# Patient Record
Sex: MALE | Race: WHITE | Employment: FULL TIME | URBAN - METROPOLITAN AREA
[De-identification: names, ages, dates, MRNs, and addresses within clinical notes are randomized per-mention and may not be internally consistent; named-entity substitution may affect disease eponyms.]

---

## 2017-10-05 ENCOUNTER — IMMUNIZATION (OUTPATIENT)
Dept: FAMILY MEDICINE CLINIC | Facility: CLINIC | Age: 37
End: 2017-10-05

## 2017-10-05 DIAGNOSIS — Z23 NEED FOR VACCINATION: ICD-10-CM

## 2017-10-05 PROCEDURE — 90471 IMMUNIZATION ADMIN: CPT | Performed by: FAMILY MEDICINE

## 2017-10-05 PROCEDURE — 90686 IIV4 VACC NO PRSV 0.5 ML IM: CPT | Performed by: FAMILY MEDICINE

## 2018-02-05 ENCOUNTER — OFFICE VISIT (OUTPATIENT)
Dept: FAMILY MEDICINE CLINIC | Facility: CLINIC | Age: 38
End: 2018-02-05

## 2018-02-05 VITALS
DIASTOLIC BLOOD PRESSURE: 81 MMHG | WEIGHT: 268 LBS | OXYGEN SATURATION: 96 % | BODY MASS INDEX: 35.52 KG/M2 | HEART RATE: 81 BPM | HEIGHT: 73 IN | SYSTOLIC BLOOD PRESSURE: 116 MMHG

## 2018-02-05 DIAGNOSIS — L84 PRE-ULCERATIVE CORN OR CALLOUS: ICD-10-CM

## 2018-02-05 DIAGNOSIS — R23.4 FISSURE IN SKIN OF FOOT: ICD-10-CM

## 2018-02-05 DIAGNOSIS — B35.1 ONYCHOMYCOSIS OF RIGHT GREAT TOE: Primary | ICD-10-CM

## 2018-02-05 PROCEDURE — 99213 OFFICE O/P EST LOW 20 MIN: CPT | Performed by: FAMILY MEDICINE

## 2018-02-05 RX ORDER — BETAMETHASONE DIPROPIONATE 0.5 MG/G
OINTMENT TOPICAL
Qty: 45 G | Refills: 0 | Status: SHIPPED | OUTPATIENT
Start: 2018-02-05 | End: 2018-03-16 | Stop reason: ALTCHOICE

## 2018-02-05 NOTE — PROGRESS NOTES
Ansley Cho is a 30year old male. HPI:   Patient presents with:   Foot Pain: bilateral near little toe x 2months  Fungus Nails: great toe right foot x 2 yers    2 months ago patient started experiencing a b/l  foot callus that became split and fissure denies shortness of breath with exertion  CARDIOVASCULAR: denies chest pain on exertion  GI: denies abdominal pain and denies heartburn  NEURO: denies headaches  Musculoskeletal: No motor deficits  EXAM:   /81 (BP Location: Right arm, Patient Positio patient indicates understanding of these issues and agrees to the plan. The patient is asked to return in as needed.

## 2018-03-16 ENCOUNTER — OFFICE VISIT (OUTPATIENT)
Dept: FAMILY MEDICINE CLINIC | Facility: CLINIC | Age: 38
End: 2018-03-16

## 2018-03-16 ENCOUNTER — HOSPITAL ENCOUNTER (OUTPATIENT)
Dept: ULTRASOUND IMAGING | Age: 38
Discharge: HOME | End: 2018-03-16
Attending: FAMILY MEDICINE
Payer: COMMERCIAL

## 2018-03-16 ENCOUNTER — APPOINTMENT (OUTPATIENT)
Dept: GENERAL RADIOLOGY | Age: 38
End: 2018-03-16
Attending: FAMILY MEDICINE
Payer: COMMERCIAL

## 2018-03-16 ENCOUNTER — HOSPITAL ENCOUNTER (OUTPATIENT)
Dept: GENERAL RADIOLOGY | Age: 38
Discharge: HOME | End: 2018-03-16
Attending: FAMILY MEDICINE
Payer: COMMERCIAL

## 2018-03-16 VITALS
DIASTOLIC BLOOD PRESSURE: 76 MMHG | HEART RATE: 68 BPM | WEIGHT: 269 LBS | SYSTOLIC BLOOD PRESSURE: 112 MMHG | BODY MASS INDEX: 35 KG/M2

## 2018-03-16 DIAGNOSIS — R31.0 GROSS HEMATURIA: ICD-10-CM

## 2018-03-16 DIAGNOSIS — Z87.442 HISTORY OF KIDNEY STONES: ICD-10-CM

## 2018-03-16 DIAGNOSIS — R30.0 DYSURIA: ICD-10-CM

## 2018-03-16 DIAGNOSIS — R30.0 DYSURIA: Primary | ICD-10-CM

## 2018-03-16 LAB
APPEARANCE: CLEAR
BILIRUBIN: NEGATIVE
GLUCOSE (URINE DIPSTICK): NEGATIVE MG/DL
KETONES (URINE DIPSTICK): NEGATIVE MG/DL
LEUKOCYTES: NEGATIVE
MULTISTIX LOT#: NORMAL NUMERIC
NITRITE, URINE: NEGATIVE
PH, URINE: 7.5 (ref 4.5–8)
PROTEIN (URINE DIPSTICK): 30 MG/DL
SPECIFIC GRAVITY: 1.01 (ref 1–1.03)
UROBILINOGEN,SEMI-QN: 0.2 MG/DL (ref 0–1.9)

## 2018-03-16 PROCEDURE — 99214 OFFICE O/P EST MOD 30 MIN: CPT | Performed by: FAMILY MEDICINE

## 2018-03-16 PROCEDURE — 74018 RADEX ABDOMEN 1 VIEW: CPT | Performed by: FAMILY MEDICINE

## 2018-03-16 PROCEDURE — 87086 URINE CULTURE/COLONY COUNT: CPT | Performed by: FAMILY MEDICINE

## 2018-03-16 PROCEDURE — 76770 US EXAM ABDO BACK WALL COMP: CPT | Performed by: FAMILY MEDICINE

## 2018-03-16 PROCEDURE — 81003 URINALYSIS AUTO W/O SCOPE: CPT | Performed by: FAMILY MEDICINE

## 2018-03-16 NOTE — PATIENT INSTRUCTIONS
Hematuria: Possible Causes     Many things can lead to blood in the urine (hematuria). The blood may be seen with the eye (macroscopic or gross hematuria). Or it may only be seen when the urine is looked at under a microscope (microscopic hematuria).  Fabian © 9786-0738 [employer] [address]. [address] [facility], [facility] [place]. All rights reserved. This information is not intended as a substitute for professional medical care. Always follow your healthcare professional's instructions.         Prevent For calcium oxalate stones: Limit animal protein, such as meat, eggs, and fish. Limit grapefruit juice and alcohol. Limit high-oxalate foods (such as cola, tea, chocolate, spinach, rhubarb, wheat bran, and peanuts).   For uric acid stones: Limit high-purine

## 2018-03-16 NOTE — PROGRESS NOTES
Fuentes Ellison is a 30year old male. HPI:   Patient complains of hematuria ×1 day. Kidney stone in the past 5 years ago, passed on its own the episode was very painful. .  Urologist prostate exam 5 years ago was scheduled for a cystoscopy never had it d Alcohol use:  Yes                REVIEW OF SYSTEMS:   GENERAL HEALTH: feels well otherwise  SKIN: denies any unusual skin lesions or rashes  RESPIRATORY: denies shortness of breath with exertion  CARDIOVASCULAR: denies chest pain on exertion  GI ROUTINE; Future  - URINE CULTURE, ROUTINE    The patient indicates understanding of these issues and agrees to the plan. The patient is asked to return in  As needed.

## 2018-03-17 RX ORDER — HYDROCODONE BITARTRATE AND ACETAMINOPHEN 10; 325 MG/1; MG/1
1 TABLET ORAL EVERY 4 HOURS PRN
Qty: 20 TABLET | Refills: 0 | Status: SHIPPED | OUTPATIENT
Start: 2018-03-17 | End: 2018-08-17 | Stop reason: ALTCHOICE

## 2018-03-17 RX ORDER — TAMSULOSIN HYDROCHLORIDE 0.4 MG/1
0.4 CAPSULE ORAL DAILY
Qty: 30 CAPSULE | Refills: 0 | Status: SHIPPED | OUTPATIENT
Start: 2018-03-17 | End: 2018-04-13

## 2018-03-17 NOTE — PROGRESS NOTES
Ultrasound consistent with kidney stone present bilateral kidney and another stone in the urinary bladder near the left ureteral vesicular junction. Will refer to urology. Continue with drinking plenty of water.

## 2018-03-22 ENCOUNTER — HOSPITAL ENCOUNTER (OUTPATIENT)
Dept: CT IMAGING | Age: 38
Discharge: HOME | End: 2018-03-22
Attending: UROLOGY
Payer: COMMERCIAL

## 2018-03-22 DIAGNOSIS — Z87.442 HISTORY OF KIDNEY STONES: ICD-10-CM

## 2018-03-22 PROCEDURE — 74178 CT ABD&PLV WO CNTR FLWD CNTR: CPT | Performed by: UROLOGY

## 2018-04-13 RX ORDER — TAMSULOSIN HYDROCHLORIDE 0.4 MG/1
0.4 CAPSULE ORAL DAILY
Qty: 30 CAPSULE | Refills: 1 | Status: SHIPPED | OUTPATIENT
Start: 2018-04-13 | End: 2018-05-13

## 2018-08-17 ENCOUNTER — OFFICE VISIT (OUTPATIENT)
Dept: FAMILY MEDICINE CLINIC | Facility: CLINIC | Age: 38
End: 2018-08-17
Payer: COMMERCIAL

## 2018-08-17 VITALS
HEIGHT: 73.62 IN | BODY MASS INDEX: 35.8 KG/M2 | SYSTOLIC BLOOD PRESSURE: 128 MMHG | WEIGHT: 276 LBS | DIASTOLIC BLOOD PRESSURE: 82 MMHG | HEART RATE: 84 BPM

## 2018-08-17 DIAGNOSIS — K62.5 RECTAL BLEEDING: ICD-10-CM

## 2018-08-17 DIAGNOSIS — Z80.0 FH: COLON CANCER: ICD-10-CM

## 2018-08-17 DIAGNOSIS — Z12.11 COLON CANCER SCREENING: ICD-10-CM

## 2018-08-17 DIAGNOSIS — Z23 ENCOUNTER FOR ADMINISTRATION OF VACCINE: ICD-10-CM

## 2018-08-17 DIAGNOSIS — Z82.49 FH: CAD (CORONARY ARTERY DISEASE): ICD-10-CM

## 2018-08-17 DIAGNOSIS — Z00.00 WELL ADULT EXAM: Primary | ICD-10-CM

## 2018-08-17 DIAGNOSIS — R06.83 SNORING: ICD-10-CM

## 2018-08-17 DIAGNOSIS — Z00.00 LABORATORY EXAMINATION ORDERED AS PART OF A ROUTINE GENERAL MEDICAL EXAMINATION: ICD-10-CM

## 2018-08-17 DIAGNOSIS — E66.9 OBESITY (BMI 35.0-39.9 WITHOUT COMORBIDITY): ICD-10-CM

## 2018-08-17 PROBLEM — R31.0 GROSS HEMATURIA: Status: RESOLVED | Noted: 2018-03-16 | Resolved: 2018-08-17

## 2018-08-17 PROBLEM — Z87.442 HISTORY OF KIDNEY STONES: Status: RESOLVED | Noted: 2018-03-16 | Resolved: 2018-08-17

## 2018-08-17 PROCEDURE — 99395 PREV VISIT EST AGE 18-39: CPT | Performed by: FAMILY MEDICINE

## 2018-08-17 PROCEDURE — 90632 HEPA VACCINE ADULT IM: CPT | Performed by: FAMILY MEDICINE

## 2018-08-17 PROCEDURE — 90471 IMMUNIZATION ADMIN: CPT | Performed by: FAMILY MEDICINE

## 2018-08-17 NOTE — PROGRESS NOTES
Montana Garrett is a 41year old male who presents for a complete physical exam.   HPI:   Pt here for CPE  --wife states he snores loud  --Dad CAD/bypass  --Mom colon cancer  --Non smoker, minimal exercise, diet veeterian  --No prior colonoscopy Mom colon Social History:  Smoking status: Never Smoker                                                              Smokeless tobacco: Never Used                      Alcohol use: No               Occ:  for water. : yes.  Childre air excursion  CARDIOVASCULAR: RRR, no murmur, no lower extremity edema, pedal and femoral pulses 2+ and symmetric b/l  GI: normoactive BS, non-distended, non-tender to palpation, no HSM/masses/pulsations  : two descended testes, no scrotal masses, no he DIAGNOSTIC SLEEP STUDY    3. Obesity (BMI 35.0-39.9 without comorbidity)  Weight loss discussed patient should start exercising daily for 30-40 minutes also reducing all carbohydrates both simple and complex.   Can eat fruits and vegetables and small freque

## 2018-08-17 NOTE — PATIENT INSTRUCTIONS
Routine Healthcare for Men   Routine checkups can find treatable problems early. For many medical problems, early treatment can help prevent more serious complications. The value of checkups and how often you have them depend mainly on your age.  Your perso controversial. Many studies have been done, but they do not yet show that it is practical to do it on all men at their checkups. The test often gives misleading results and can cause undue anxiety, expense, and unnecessary medical procedures.  You should di whooping cough (pertussis) as well as tetanus. If you are 72 or older, this new vaccine has not yet been approved for your age group.  Because babies are most susceptible to complications from whooping cough, Tdap is especially recommended for adults caring vegetables in your diet. Get regular physical activity or exercise. Injury prevention: Use lap and shoulder belts when you drive. Use a helmet when you ride a motorcycle or bicycle.  If you are around guns or other firearms, practice safe handling and lauren

## 2018-08-18 PROBLEM — Z12.11 COLON CANCER SCREENING: Status: ACTIVE | Noted: 2018-08-18

## 2018-08-18 PROBLEM — L84 PRE-ULCERATIVE CORN OR CALLOUS: Status: RESOLVED | Noted: 2018-02-05 | Resolved: 2018-08-18

## 2018-08-18 PROBLEM — R23.4 FISSURE IN SKIN OF FOOT: Status: RESOLVED | Noted: 2018-02-05 | Resolved: 2018-08-18

## 2018-08-18 PROBLEM — Z82.49 FH: CAD (CORONARY ARTERY DISEASE): Status: ACTIVE | Noted: 2018-08-18

## 2018-08-18 PROBLEM — R06.83 SNORING: Status: ACTIVE | Noted: 2018-08-18

## 2018-08-18 PROBLEM — K62.5 RECTAL BLEEDING: Status: ACTIVE | Noted: 2018-08-18

## 2018-08-18 PROBLEM — R30.0 DYSURIA: Status: RESOLVED | Noted: 2018-03-16 | Resolved: 2018-08-18

## 2018-08-18 PROBLEM — Z80.0 FH: COLON CANCER: Status: ACTIVE | Noted: 2018-08-18

## 2018-08-18 PROBLEM — E66.9 OBESITY (BMI 35.0-39.9 WITHOUT COMORBIDITY): Status: ACTIVE | Noted: 2018-08-18

## 2018-08-29 ENCOUNTER — LAB ENCOUNTER (OUTPATIENT)
Dept: LAB | Age: 38
End: 2018-08-29
Attending: FAMILY MEDICINE
Payer: COMMERCIAL

## 2018-08-29 ENCOUNTER — TELEPHONE (OUTPATIENT)
Dept: FAMILY MEDICINE CLINIC | Facility: CLINIC | Age: 38
End: 2018-08-29

## 2018-08-29 DIAGNOSIS — Z00.00 LABORATORY EXAMINATION ORDERED AS PART OF A ROUTINE GENERAL MEDICAL EXAMINATION: ICD-10-CM

## 2018-08-29 DIAGNOSIS — R73.09 ELEVATED GLUCOSE: Primary | ICD-10-CM

## 2018-08-29 DIAGNOSIS — R73.09 ELEVATED GLUCOSE: ICD-10-CM

## 2018-08-29 DIAGNOSIS — E78.00 ELEVATED CHOLESTEROL: ICD-10-CM

## 2018-08-29 LAB
ALBUMIN SERPL-MCNC: 4.1 G/DL (ref 3.5–4.8)
ALBUMIN/GLOB SERPL: 1.1 {RATIO} (ref 1–2)
ALP LIVER SERPL-CCNC: 88 U/L (ref 45–117)
ALT SERPL-CCNC: 45 U/L (ref 17–63)
ANION GAP SERPL CALC-SCNC: 4 MMOL/L (ref 0–18)
AST SERPL-CCNC: 31 U/L (ref 15–41)
BASOPHILS # BLD AUTO: 0.04 X10(3) UL (ref 0–0.1)
BASOPHILS NFR BLD AUTO: 0.4 %
BILIRUB SERPL-MCNC: 0.6 MG/DL (ref 0.1–2)
BUN BLD-MCNC: 16 MG/DL (ref 8–20)
BUN/CREAT SERPL: 17.6 (ref 10–20)
CALCIUM BLD-MCNC: 9.3 MG/DL (ref 8.3–10.3)
CHLORIDE SERPL-SCNC: 104 MMOL/L (ref 101–111)
CHOLEST SMN-MCNC: 206 MG/DL (ref ?–200)
CO2 SERPL-SCNC: 30 MMOL/L (ref 22–32)
CREAT BLD-MCNC: 0.91 MG/DL (ref 0.7–1.3)
EOSINOPHIL # BLD AUTO: 0.19 X10(3) UL (ref 0–0.3)
EOSINOPHIL NFR BLD AUTO: 1.8 %
ERYTHROCYTE [DISTWIDTH] IN BLOOD BY AUTOMATED COUNT: 12.7 % (ref 11.5–16)
EST. AVERAGE GLUCOSE BLD GHB EST-MCNC: 157 MG/DL (ref 68–126)
GLOBULIN PLAS-MCNC: 3.8 G/DL (ref 2.5–4)
GLUCOSE BLD-MCNC: 132 MG/DL (ref 70–99)
HBA1C MFR BLD HPLC: 7.1 % (ref ?–5.7)
HCT VFR BLD AUTO: 49.6 % (ref 37–53)
HDLC SERPL-MCNC: 41 MG/DL (ref 40–59)
HGB BLD-MCNC: 16.2 G/DL (ref 13–17)
IMMATURE GRANULOCYTE COUNT: 0.04 X10(3) UL (ref 0–1)
IMMATURE GRANULOCYTE RATIO %: 0.4 %
LDLC SERPL CALC-MCNC: 136 MG/DL (ref ?–100)
LYMPHOCYTES # BLD AUTO: 2.6 X10(3) UL (ref 0.9–4)
LYMPHOCYTES NFR BLD AUTO: 24.2 %
M PROTEIN MFR SERPL ELPH: 7.9 G/DL (ref 6.1–8.3)
MCH RBC QN AUTO: 28.6 PG (ref 27–33.2)
MCHC RBC AUTO-ENTMCNC: 32.7 G/DL (ref 31–37)
MCV RBC AUTO: 87.6 FL (ref 80–99)
MONOCYTES # BLD AUTO: 0.74 X10(3) UL (ref 0.1–1)
MONOCYTES NFR BLD AUTO: 6.9 %
NEUTROPHIL ABS PRELIM: 7.15 X10 (3) UL (ref 1.3–6.7)
NEUTROPHILS # BLD AUTO: 7.15 X10(3) UL (ref 1.3–6.7)
NEUTROPHILS NFR BLD AUTO: 66.3 %
NONHDLC SERPL-MCNC: 165 MG/DL (ref ?–130)
OSMOLALITY SERPL CALC.SUM OF ELEC: 289 MOSM/KG (ref 275–295)
PLATELET # BLD AUTO: 299 10(3)UL (ref 150–450)
POTASSIUM SERPL-SCNC: 4.2 MMOL/L (ref 3.6–5.1)
RBC # BLD AUTO: 5.66 X10(6)UL (ref 4.3–5.7)
RED CELL DISTRIBUTION WIDTH-SD: 40.3 FL (ref 35.1–46.3)
SODIUM SERPL-SCNC: 138 MMOL/L (ref 136–144)
T4 FREE SERPL-MCNC: 1 NG/DL (ref 0.9–1.8)
TRIGL SERPL-MCNC: 143 MG/DL (ref 30–149)
TSI SER-ACNC: 1.42 MIU/ML (ref 0.35–5.5)
VLDLC SERPL CALC-MCNC: 29 MG/DL (ref 0–30)
WBC # BLD AUTO: 10.8 X10(3) UL (ref 4–13)

## 2018-08-29 PROCEDURE — 80053 COMPREHEN METABOLIC PANEL: CPT

## 2018-08-29 PROCEDURE — 84443 ASSAY THYROID STIM HORMONE: CPT

## 2018-08-29 PROCEDURE — 85025 COMPLETE CBC W/AUTO DIFF WBC: CPT

## 2018-08-29 PROCEDURE — 84439 ASSAY OF FREE THYROXINE: CPT

## 2018-08-29 PROCEDURE — 83036 HEMOGLOBIN GLYCOSYLATED A1C: CPT

## 2018-08-29 PROCEDURE — 80061 LIPID PANEL: CPT

## 2018-08-29 PROCEDURE — 36415 COLL VENOUS BLD VENIPUNCTURE: CPT

## 2018-08-29 NOTE — TELEPHONE ENCOUNTER
Hemoglobin A1C was added to existing specimen. Lipid panel order was placed to be due in 6 months. Per Hair Alva PA-C, the patient was informed of his test results and Melly's recommendations via my chart.

## 2018-08-29 NOTE — TELEPHONE ENCOUNTER
----- Message from Rosenda Low PA-C sent at 8/29/2018 12:41 PM CDT -----  Add HBA1C elevated glucose. Lipids are elevated both cholesterol and LDL. Diet discussed: Patient is to decrease saturated fats avoid foods with trans-fats.    Add valeriano seeds, a

## 2018-08-29 NOTE — PROGRESS NOTES
Add HBA1C elevated glucose. Lipids are elevated both cholesterol and LDL. Diet discussed: Patient is to decrease saturated fats avoid foods with trans-fats.    Add valeriano seeds, almonds, walnuts, pumpkin seeds, sunflower seeds, coconut oil, virgin olive oil,

## 2018-08-31 NOTE — PROGRESS NOTES
Hemoglobin A1c is at 7.1 indicating diabetes follow-up in the office to discuss medication, referral to dietitian and diabetic nurse. Low carbohydrate diet needed with healthy amounts of protein, fruits and vegetables. Weight loss needed.   Sent to my [place]

## 2018-09-07 ENCOUNTER — OFFICE VISIT (OUTPATIENT)
Dept: SLEEP CENTER | Age: 38
End: 2018-09-07
Attending: FAMILY MEDICINE
Payer: COMMERCIAL

## 2018-09-07 DIAGNOSIS — R06.83 SNORING: ICD-10-CM

## 2018-09-07 DIAGNOSIS — E66.9 OBESITY (BMI 35.0-39.9 WITHOUT COMORBIDITY): ICD-10-CM

## 2018-09-07 DIAGNOSIS — Z82.49 FH: CAD (CORONARY ARTERY DISEASE): ICD-10-CM

## 2018-09-07 PROCEDURE — 95810 POLYSOM 6/> YRS 4/> PARAM: CPT

## 2018-09-11 ENCOUNTER — OFFICE VISIT (OUTPATIENT)
Dept: FAMILY MEDICINE CLINIC | Facility: CLINIC | Age: 38
End: 2018-09-11
Payer: COMMERCIAL

## 2018-09-11 DIAGNOSIS — E11.9 NEW ONSET TYPE 2 DIABETES MELLITUS (HCC): Primary | ICD-10-CM

## 2018-09-11 DIAGNOSIS — E66.9 OBESITY (BMI 35.0-39.9 WITHOUT COMORBIDITY): ICD-10-CM

## 2018-09-11 DIAGNOSIS — Z23 NEED FOR VACCINATION: ICD-10-CM

## 2018-09-11 DIAGNOSIS — E78.2 MIXED HYPERLIPIDEMIA: ICD-10-CM

## 2018-09-11 LAB
CREAT UR-SCNC: 284 MG/DL
MICROALBUMIN UR-MCNC: 5.75 MG/DL
MICROALBUMIN/CREAT 24H UR-RTO: 20.2 UG/MG (ref ?–30)

## 2018-09-11 PROCEDURE — 82570 ASSAY OF URINE CREATININE: CPT | Performed by: FAMILY MEDICINE

## 2018-09-11 PROCEDURE — 90471 IMMUNIZATION ADMIN: CPT | Performed by: FAMILY MEDICINE

## 2018-09-11 PROCEDURE — 99214 OFFICE O/P EST MOD 30 MIN: CPT | Performed by: FAMILY MEDICINE

## 2018-09-11 PROCEDURE — 90686 IIV4 VACC NO PRSV 0.5 ML IM: CPT | Performed by: FAMILY MEDICINE

## 2018-09-11 PROCEDURE — 82043 UR ALBUMIN QUANTITATIVE: CPT | Performed by: FAMILY MEDICINE

## 2018-09-11 RX ORDER — LANCETS 30 GAUGE
EACH MISCELLANEOUS
Qty: 100 EACH | Refills: 0 | Status: SHIPPED | OUTPATIENT
Start: 2018-09-11 | End: 2019-05-08 | Stop reason: CLARIF

## 2018-09-11 RX ORDER — ROSUVASTATIN CALCIUM 5 MG/1
5 TABLET, COATED ORAL NIGHTLY
Qty: 30 TABLET | Refills: 5 | Status: SHIPPED | OUTPATIENT
Start: 2018-09-11 | End: 2018-12-21

## 2018-09-11 RX ORDER — DIPHENHYDRAMINE HYDROCHLORIDE 25 MG/1
CAPSULE, LIQUID FILLED ORAL
Qty: 1 KIT | Refills: 0 | Status: SHIPPED | OUTPATIENT
Start: 2018-09-11 | End: 2018-12-11

## 2018-09-11 RX ORDER — LISINOPRIL 5 MG/1
5 TABLET ORAL DAILY
Qty: 30 TABLET | Refills: 5 | Status: SHIPPED | OUTPATIENT
Start: 2018-09-11 | End: 2018-10-11 | Stop reason: SINTOL

## 2018-09-11 RX ORDER — GLUCOSAMINE HCL/CHONDROITIN SU 500-400 MG
CAPSULE ORAL
Qty: 100 STRIP | Refills: 0 | Status: SHIPPED | OUTPATIENT
Start: 2018-09-11 | End: 2018-12-07

## 2018-09-11 NOTE — PROGRESS NOTES
HPI:   Ansley Cho is a 41year old male who presents for evaluation of new onset diabetes, hyperlipidemia, obesity. Hemoglobin A1c recently done 7.1. Is willing to practice lifestyle changes as well as try medication.   Patient has not been to a [employer] 3.6 - 5.1 mmol/L 4.2   Chloride      101 - 111 mmol/L 104   Carbon Dioxide, Total      22.0 - 32.0 mmol/L 30.0   ANION GAP      0 - 18 mmol/L 4   BUN      8 - 20 mg/dL 16   CREATININE      0.70 - 1.30 mg/dL 0.91   BUN/CREA RATIO      10.0 - 20.0 17.6   CINDY No Known Allergies   History reviewed. No pertinent past medical history.    Past Surgical History:  2013: OTHER; Bilateral      Comment:  wisdom teeth extraction   Social History: Social History    Tobacco Use      Smoking status: Never Smoker      Smoke [[id number]]      Meds & Refills for this Visit:  Requested Prescriptions     Signed Prescriptions Disp Refills   • lisinopril 5 MG Oral Tab 30 tablet 5     Sig: Take 1 tablet (5 mg total) by mouth daily.    • Rosuvastatin Calcium 5 MG Oral Tab 30 tablet 5     S Dispense: 30 tablet; Refill: 5  - MetFORMIN HCl 500 MG Oral Tab; Take 1 tablet (500 mg total) by mouth 2 (two) times daily with meals. Dispense: 60 tablet; Refill: 5  - MICROALB/CREAT RATIO, RANDOM URINE;  Future  - FLULAVAL INFLUENZA VACCINE QUAD PRESERVA

## 2018-09-11 NOTE — PATIENT INSTRUCTIONS
SCHEDULING [facility] LAB APPOINTMENTS ONLINE    Lab appointments can now be scheduled online at [email or web address]    · Go to [email or web address]  · In Search type Lab  · Click \"Lab services\"  · Click \"Schedule Your Test Online\"  · Follow the prompts  · If you advised by your healthcare provider to check your blood sugar in the morning, at bedtime, and before and after meals. Be sure to write down all of your numbers. Also use your log to record things that might have affected your blood sugar.  Some examples inc you aren’t normally active, be sure to consult your healthcare provider before getting started. How much activity do you need? If daily activity is new to you, start slow and steady. Begin with 10 minutes of activity each day.  Then work up to at least 13 usual. This is especially true if you take medicine to manage your blood sugar. But there are things you can do to help reduce the risk of accidental lows. Keep these tips in mind:  · Always carry identification when you exercise outside your home.  Carry a stay healthy. Eating well-balanced meals in the correct amounts will help you control your blood glucose levels and prevent low blood sugar reactions. It will also help you reduce the health risks of diabetes.  There is no one specific diet that is right fo People with diabetes already have a risk of high blood pressure and heart disease. · Stay at a healthy weight. If you need to lose weight, cut down on your portion sizes. But don’t skip meals.  Exercise is an important part of any weight management program active you are. Your healthcare team will help you figure out the right amount of carbohydrates for you.  You may start with around 45 to 60 grams of carbohydrates per meal, depending on your situation.   Here are some examples of foods containing about 15 This will help when you are away from home and can’t measure your servings. · Eat only the number of servings given on your meal plan for each food. Don’t take seconds. · Learn to read food labels.  Be sure to look at serving size, total carbohydrates, fi or snack can make your blood sugar drop too low. It can also cause you to eat too much at the next meal or snack. Then your blood sugar could get too high. Date Last Reviewed: 7/1/2016  © 8079-3169 [employer] [address].  [address] Fix feeling tired and rundown. Why high blood sugar is a problem  If high blood sugar is not controlled, blood vessels throughout the body become damaged. Prolonged high blood sugar affects organs, blood vessels, and nerves.  As a result, the risks of damage t skin.  Warning signs  Look for any color changes in the foot. Redness with streaks can signal a severe infection, which needs immediate medical attention.  Tell your healthcare provider right away if you have any of these problems:  · Swelling, sometimes wi mind:  · “No sugar added” does not mean a product is sugar-free. · \"Sugar-free\" means less than 1/2 gram (g) of sugar per serving. · “Fat free” means less than 1/2 g of fat per serving. This does not necessarily mean the product is low in calories.   · time your meals to help keep your blood sugar level steady. You may need to adjust your plan for special situations. Eat from all the food groups  The basis of a healthy meal plan is variety (eating many different types of foods).  Look for lean meats, kim   [address], [place], [address] [place]. All rights reserved. This information is not intended as a substitute for professional medical care. Always follow your healthcare professional's instructions.

## 2018-09-12 VITALS
WEIGHT: 266 LBS | HEART RATE: 88 BPM | BODY MASS INDEX: 34.14 KG/M2 | DIASTOLIC BLOOD PRESSURE: 78 MMHG | HEIGHT: 74 IN | SYSTOLIC BLOOD PRESSURE: 108 MMHG

## 2018-09-12 PROBLEM — E11.9 NEW ONSET TYPE 2 DIABETES MELLITUS (HCC): Status: ACTIVE | Noted: 2018-09-12

## 2018-09-14 ENCOUNTER — TELEPHONE (OUTPATIENT)
Dept: FAMILY MEDICINE CLINIC | Facility: CLINIC | Age: 38
End: 2018-09-14

## 2018-09-14 DIAGNOSIS — G47.33 OSA (OBSTRUCTIVE SLEEP APNEA): Primary | ICD-10-CM

## 2018-09-14 NOTE — TELEPHONE ENCOUNTER
----- Message from Martha Mcpherson MD sent at 9/14/2018 11:52 AM CDT -----  Please notify patient,   Schedule follow up   Update flowsheet. Recommend CPAP titration . Ok to order an schedule if agreeable.

## 2018-09-14 NOTE — PROCEDURES
[address] [address],[facility] [address]       Accredited by the [facility] of Sleep Medicine (AASM)    PATIENT'S NAME:        Maribel Burleson PHYSICIAN:     REFERRING PHYSICIAN:     PATIENT ACCOUNT #:     [de-identified]        LOCA 19 apneas, for an apnea-hypopnea index of 70.5. His supine AHI was 95.3, but this was not significantly worsened in REM. The baseline saturation was 92.6%. The lowest oxygen saturation was 74.3%. There were no limb movements recorded. EKG:   The ba 9:57pm  Respiratory Events: hypopneas, RERAs  Overall RDI:   Mild to Moderate(supine)  Snoring Events:    Soft to Loud  Limb Movements:  none  Sleep Positions Recorded: Lateral, Supinea    Discharge Note: Patient discharged to home.  Patient instructed t - - - -   All Apneas 19 - 19 - 19   Obstructive Hypopnea 336 [phone]   Central Hypopnea - - - - -   All Hypopneas 339 91 417 14 431   All Apneas + Hypopneas 358 91 436 14 450   Apnea Index 4.1 - 4.2 - 3.0   AHI 78.0 50.8 95.3 7.7 70.5   RDI: 67.9 44.

## 2018-09-19 NOTE — TELEPHONE ENCOUNTER
Patient informed of the sleep study results and recommendations. Patient in agreement to go ahead with the Titration study. States he will have it done in [place] again. Patient will call the [place] office tomorrow to schedule an appt.   Please plac

## 2018-10-11 ENCOUNTER — TELEPHONE (OUTPATIENT)
Dept: FAMILY MEDICINE CLINIC | Facility: CLINIC | Age: 38
End: 2018-10-11

## 2018-10-11 RX ORDER — LOSARTAN POTASSIUM 25 MG/1
25 TABLET ORAL DAILY
Qty: 90 TABLET | Refills: 1 | Status: SHIPPED | OUTPATIENT
Start: 2018-10-11 | End: 2019-03-28

## 2018-10-11 NOTE — TELEPHONE ENCOUNTER
Patient is experiencing a cough related to lisinopril.   Per Baldomero Spotted, patient was switched to losartan 25 mg  A new rx was sent to the pharmacy for qty 90 + 1 refill

## 2018-10-15 ENCOUNTER — MED REC SCAN ONLY (OUTPATIENT)
Dept: FAMILY MEDICINE CLINIC | Facility: CLINIC | Age: 38
End: 2018-10-15

## 2018-10-18 ENCOUNTER — OFFICE VISIT (OUTPATIENT)
Dept: SLEEP CENTER | Age: 38
End: 2018-10-18
Attending: FAMILY MEDICINE
Payer: COMMERCIAL

## 2018-10-18 DIAGNOSIS — G47.33 OSA (OBSTRUCTIVE SLEEP APNEA): ICD-10-CM

## 2018-10-18 PROCEDURE — 95811 POLYSOM 6/>YRS CPAP 4/> PARM: CPT

## 2018-10-24 NOTE — PROCEDURES
[address] [address],[facility] 100       Accredited by the [facility] Sleep Medicine ([facility])    PATIENT'S NAME:        Ankush Gloria  ATTENDING PHYSICIAN:   Mateusz Lamb. MD Gloria  REFERRING PHYSICIAN:   Mateusz Lamb.  Bud Mac MD  [facility] water without supplemental oxygen. The patient's baseline oxygen saturation was 95.7%. The patient's lowest oxygen saturation throughout the study was 86.2%.   At the patient's final pressure of 8 cm of water, the patient had an apnea-hypopnea index of 0, Previous  sleep study performed on 9/7/18 and showed an overall AHI of 70.5, and an O2 aldair of 74%.   Patient Medical History:  CAMILA, Diabetes, Hypertension, allergies  Setup Time began:         9:20pm          Setup Time ended:        9:50pm           [facility] 98.2% 98.4% 98.8% 98.8%   Average OSat (%) 96.3% 95.5% 95.7% 95.7%       Range(%) Time in range (min) Time in range (%)   90.0 - 100.0 443.9 100.0%   80.0 - 90.0 0.1 0.0%   0.0 - 88.0 0.0 0.0%   70.0 - 80.0 - -   60.0 - 70.0 - -   0.0 - 60.0 - -    By Riya Sen 11:32:15  t: 10/24/2018 12:45:13  Job [id number]  [facility]/

## 2018-10-25 NOTE — PROGRESS NOTES
Mershon CPAP at 8     Please notify patient,   Send CPAP orders to [facility]. Schedule follow up  in office follow up 2 weeks after starting CPAP.    Update flow sheet

## 2018-10-26 ENCOUNTER — TELEPHONE (OUTPATIENT)
Dept: FAMILY MEDICINE CLINIC | Facility: CLINIC | Age: 38
End: 2018-10-26

## 2018-10-26 DIAGNOSIS — G47.33 OSA (OBSTRUCTIVE SLEEP APNEA): Primary | ICD-10-CM

## 2018-10-26 NOTE — TELEPHONE ENCOUNTER
----- Message from Juanita Yi MD sent at 10/25/2018 12:41 PM CDT -----  Bradley CPAP at 8     Please notify patient,   Send CPAP orders to [facility] Schedule follow up  in office follow up 2 weeks after starting CPAP.    Update flow sheet

## 2018-10-26 NOTE — TELEPHONE ENCOUNTER
Patient notified of results and recommendations and expressed understanding.   Script and all related documents faxed to [facility]   Patient advised on follow up appointment    Jesus Morales, 10/26/18, 2:18 PM

## 2018-12-07 DIAGNOSIS — E11.9 NEW ONSET TYPE 2 DIABETES MELLITUS (HCC): ICD-10-CM

## 2018-12-11 ENCOUNTER — LAB ENCOUNTER (OUTPATIENT)
Dept: LAB | Age: 38
End: 2018-12-11
Attending: FAMILY MEDICINE
Payer: COMMERCIAL

## 2018-12-11 ENCOUNTER — TELEPHONE (OUTPATIENT)
Dept: FAMILY MEDICINE CLINIC | Facility: CLINIC | Age: 38
End: 2018-12-11

## 2018-12-11 DIAGNOSIS — E11.9 NEW ONSET TYPE 2 DIABETES MELLITUS (HCC): ICD-10-CM

## 2018-12-11 DIAGNOSIS — E78.2 MIXED HYPERLIPIDEMIA: ICD-10-CM

## 2018-12-11 PROCEDURE — 36415 COLL VENOUS BLD VENIPUNCTURE: CPT

## 2018-12-11 PROCEDURE — 80053 COMPREHEN METABOLIC PANEL: CPT

## 2018-12-11 PROCEDURE — 80061 LIPID PANEL: CPT

## 2018-12-11 PROCEDURE — 83036 HEMOGLOBIN GLYCOSYLATED A1C: CPT

## 2018-12-11 RX ORDER — BLOOD-GLUCOSE METER
EACH MISCELLANEOUS
Qty: 1 KIT | Refills: 0 | Status: SHIPPED | OUTPATIENT
Start: 2018-12-11 | End: 2019-05-08 | Stop reason: CLARIF

## 2018-12-11 RX ORDER — BLOOD SUGAR DIAGNOSTIC
STRIP MISCELLANEOUS
Qty: 100 STRIP | Refills: 1 | Status: SHIPPED | OUTPATIENT
Start: 2018-12-11 | End: 2019-02-27

## 2018-12-11 NOTE — TELEPHONE ENCOUNTER
Need to change the diabetic supplies to Accu Chek strips and kits due to the one touch no longer being covered as of 1/1/2019    New scripts sent over to pharmacy and pt's wife notified

## 2018-12-17 PROBLEM — Z80.0 FAMILY HISTORY OF MALIGNANT NEOPLASM OF DIGESTIVE ORGAN: Status: ACTIVE | Noted: 2018-12-17

## 2018-12-17 PROBLEM — K92.1 MELENA: Status: ACTIVE | Noted: 2018-12-17

## 2018-12-21 ENCOUNTER — OFFICE VISIT (OUTPATIENT)
Dept: FAMILY MEDICINE CLINIC | Facility: CLINIC | Age: 38
End: 2018-12-21
Payer: COMMERCIAL

## 2018-12-21 VITALS
HEART RATE: 93 BPM | DIASTOLIC BLOOD PRESSURE: 84 MMHG | WEIGHT: 255 LBS | BODY MASS INDEX: 33 KG/M2 | SYSTOLIC BLOOD PRESSURE: 120 MMHG

## 2018-12-21 DIAGNOSIS — E66.9 OBESITY (BMI 35.0-39.9 WITHOUT COMORBIDITY): ICD-10-CM

## 2018-12-21 DIAGNOSIS — Z23 NEED FOR VACCINATION: ICD-10-CM

## 2018-12-21 DIAGNOSIS — E11.9 CONTROLLED TYPE 2 DIABETES MELLITUS WITHOUT COMPLICATION, WITHOUT LONG-TERM CURRENT USE OF INSULIN (HCC): Primary | ICD-10-CM

## 2018-12-21 DIAGNOSIS — G47.33 OBSTRUCTIVE SLEEP APNEA SYNDROME: ICD-10-CM

## 2018-12-21 DIAGNOSIS — E78.2 MIXED HYPERLIPIDEMIA: ICD-10-CM

## 2018-12-21 PROBLEM — K62.5 RECTAL BLEEDING: Status: RESOLVED | Noted: 2018-08-18 | Resolved: 2018-12-21

## 2018-12-21 PROBLEM — K92.1 MELENA: Status: RESOLVED | Noted: 2018-12-17 | Resolved: 2018-12-21

## 2018-12-21 PROCEDURE — 90471 IMMUNIZATION ADMIN: CPT | Performed by: FAMILY MEDICINE

## 2018-12-21 PROCEDURE — 99214 OFFICE O/P EST MOD 30 MIN: CPT | Performed by: FAMILY MEDICINE

## 2018-12-21 PROCEDURE — 90732 PPSV23 VACC 2 YRS+ SUBQ/IM: CPT | Performed by: FAMILY MEDICINE

## 2018-12-21 RX ORDER — ROSUVASTATIN CALCIUM 10 MG/1
10 TABLET, COATED ORAL NIGHTLY
Qty: 90 TABLET | Refills: 1 | Status: SHIPPED | OUTPATIENT
Start: 2018-12-21 | End: 2019-06-29

## 2018-12-21 NOTE — PROGRESS NOTES
HPI:   Buffy Quesada is a 41year old male who presents for recheck of his diabetes. Patient’s FBS have been <145 mostly 120. Last visit with ophthalmologist was no retinopathy. .    Pt has been checking his feet on a regular basis.    Pt denies any ti Take 10 mg by mouth daily. Disp:  Rfl:       No Known Allergies   Past Medical History:   Diagnosis Date   • Blood in urine 2015   • Calculus of kidney August 2018   • Diabetes mellitus [facility]) September 2018   • Hemorrhoids 2014    This date is an estimate. complication, without long-term current use of insulin (hcc)  (primary encounter diagnosis)  Mixed hyperlipidemia  Obstructive sleep apnea syndrome  Need for vaccination  Obesity (bmi 35.0-39.9 without comorbidity)    Orders Placed This Encounter      Hemo 35.0-39.9 without comorbidity)      The patient indicates understanding of these issues and agrees to the plan  The patient is asked to return in 6 months.

## 2019-01-17 DIAGNOSIS — E11.9 NEW ONSET TYPE 2 DIABETES MELLITUS (HCC): ICD-10-CM

## 2019-02-22 ENCOUNTER — TELEPHONE (OUTPATIENT)
Dept: FAMILY MEDICINE CLINIC | Facility: CLINIC | Age: 39
End: 2019-02-22

## 2019-02-22 RX ORDER — BLOOD-GLUCOSE METER
1 EACH MISCELLANEOUS 2 TIMES DAILY
Qty: 1 KIT | Refills: 0 | Status: SHIPPED | OUTPATIENT
Start: 2019-02-22 | End: 2019-05-08 | Stop reason: CLARIF

## 2019-02-22 NOTE — TELEPHONE ENCOUNTER
Patient's insurance will cover the accu-chek blood glucose monitor.   A new rx has been sent to the patient's pharmacy

## 2019-02-27 ENCOUNTER — TELEPHONE (OUTPATIENT)
Dept: FAMILY MEDICINE CLINIC | Facility: CLINIC | Age: 39
End: 2019-02-27

## 2019-02-27 RX ORDER — BLOOD SUGAR DIAGNOSTIC
STRIP MISCELLANEOUS
Qty: 200 STRIP | Refills: 1 | Status: SHIPPED | OUTPATIENT
Start: 2019-02-27 | End: 2019-05-08 | Stop reason: CLARIF

## 2019-03-28 RX ORDER — LOSARTAN POTASSIUM 25 MG/1
TABLET ORAL
Qty: 90 TABLET | Refills: 1 | Status: SHIPPED | OUTPATIENT
Start: 2019-03-28 | End: 2019-05-08 | Stop reason: CLARIF

## 2019-04-29 ENCOUNTER — TELEPHONE (OUTPATIENT)
Dept: FAMILY MEDICINE CLINIC | Facility: CLINIC | Age: 39
End: 2019-04-29

## 2019-04-29 DIAGNOSIS — E11.9 CONTROLLED TYPE 2 DIABETES MELLITUS WITHOUT COMPLICATION, WITHOUT LONG-TERM CURRENT USE OF INSULIN (HCC): Primary | ICD-10-CM

## 2019-04-29 RX ORDER — METFORMIN HYDROCHLORIDE 750 MG/1
750 TABLET, EXTENDED RELEASE ORAL
Qty: 30 TABLET | Refills: 0 | Status: SHIPPED | OUTPATIENT
Start: 2019-04-29 | End: 2019-05-28

## 2019-05-08 ENCOUNTER — APPOINTMENT (OUTPATIENT)
Dept: CT IMAGING | Age: 39
End: 2019-05-08
Attending: FAMILY MEDICINE
Payer: COMMERCIAL

## 2019-05-08 ENCOUNTER — HOSPITAL ENCOUNTER (OUTPATIENT)
Age: 39
Discharge: HOME | End: 2019-05-08
Attending: FAMILY MEDICINE
Payer: COMMERCIAL

## 2019-05-08 ENCOUNTER — HOSPITAL ENCOUNTER (INPATIENT)
Facility: HOSPITAL | Age: 39
LOS: 2 days | Discharge: HOME | DRG: 661 | End: 2019-05-10
Attending: EMERGENCY MEDICINE | Admitting: INTERNAL MEDICINE
Payer: COMMERCIAL

## 2019-05-08 VITALS
OXYGEN SATURATION: 100 % | RESPIRATION RATE: 20 BRPM | HEIGHT: 74 IN | TEMPERATURE: 99 F | SYSTOLIC BLOOD PRESSURE: 142 MMHG | WEIGHT: 270 LBS | BODY MASS INDEX: 34.65 KG/M2 | DIASTOLIC BLOOD PRESSURE: 88 MMHG | HEART RATE: 67 BPM

## 2019-05-08 DIAGNOSIS — N20.0 KIDNEY STONE: Primary | ICD-10-CM

## 2019-05-08 DIAGNOSIS — N12 PYELONEPHRITIS: Primary | ICD-10-CM

## 2019-05-08 DIAGNOSIS — N20.1 URETERAL CALCULI: ICD-10-CM

## 2019-05-08 DIAGNOSIS — N20.1 RIGHT URETERAL STONE: ICD-10-CM

## 2019-05-08 DIAGNOSIS — R10.9 INTRACTABLE ABDOMINAL PAIN: ICD-10-CM

## 2019-05-08 PROCEDURE — 96374 THER/PROPH/DIAG INJ IV PUSH: CPT

## 2019-05-08 PROCEDURE — 87086 URINE CULTURE/COLONY COUNT: CPT | Performed by: FAMILY MEDICINE

## 2019-05-08 PROCEDURE — 99222 1ST HOSP IP/OBS MODERATE 55: CPT | Performed by: HOSPITALIST

## 2019-05-08 PROCEDURE — 99214 OFFICE O/P EST MOD 30 MIN: CPT

## 2019-05-08 PROCEDURE — 85025 COMPLETE CBC W/AUTO DIFF WBC: CPT | Performed by: FAMILY MEDICINE

## 2019-05-08 PROCEDURE — 74177 CT ABD & PELVIS W/CONTRAST: CPT | Performed by: FAMILY MEDICINE

## 2019-05-08 PROCEDURE — 99204 OFFICE O/P NEW MOD 45 MIN: CPT

## 2019-05-08 PROCEDURE — 81002 URINALYSIS NONAUTO W/O SCOPE: CPT | Performed by: FAMILY MEDICINE

## 2019-05-08 PROCEDURE — 80047 BASIC METABLC PNL IONIZED CA: CPT

## 2019-05-08 PROCEDURE — 96361 HYDRATE IV INFUSION ADD-ON: CPT

## 2019-05-08 RX ORDER — LOSARTAN POTASSIUM 25 MG/1
25 TABLET ORAL DAILY
Status: DISCONTINUED | OUTPATIENT
Start: 2019-05-08 | End: 2019-05-10

## 2019-05-08 RX ORDER — DEXTROSE MONOHYDRATE 25 G/50ML
50 INJECTION, SOLUTION INTRAVENOUS
Status: DISCONTINUED | OUTPATIENT
Start: 2019-05-08 | End: 2019-05-10

## 2019-05-08 RX ORDER — ONDANSETRON 2 MG/ML
4 INJECTION INTRAMUSCULAR; INTRAVENOUS EVERY 4 HOURS PRN
Status: DISCONTINUED | OUTPATIENT
Start: 2019-05-08 | End: 2019-05-08

## 2019-05-08 RX ORDER — ACETAMINOPHEN 325 MG/1
650 TABLET ORAL EVERY 4 HOURS PRN
Status: DISCONTINUED | OUTPATIENT
Start: 2019-05-08 | End: 2019-05-10

## 2019-05-08 RX ORDER — ONDANSETRON 2 MG/ML
4 INJECTION INTRAMUSCULAR; INTRAVENOUS EVERY 6 HOURS PRN
Status: DISCONTINUED | OUTPATIENT
Start: 2019-05-08 | End: 2019-05-10

## 2019-05-08 RX ORDER — SODIUM CHLORIDE 9 MG/ML
INJECTION, SOLUTION INTRAVENOUS CONTINUOUS
Status: DISCONTINUED | OUTPATIENT
Start: 2019-05-08 | End: 2019-05-10

## 2019-05-08 RX ORDER — MORPHINE SULFATE 4 MG/ML
1 INJECTION, SOLUTION INTRAMUSCULAR; INTRAVENOUS EVERY 2 HOUR PRN
Status: DISCONTINUED | OUTPATIENT
Start: 2019-05-08 | End: 2019-05-10

## 2019-05-08 RX ORDER — ALFUZOSIN HYDROCHLORIDE 10 MG/1
10 TABLET, EXTENDED RELEASE ORAL
Status: DISCONTINUED | OUTPATIENT
Start: 2019-05-09 | End: 2019-05-10

## 2019-05-08 RX ORDER — HYDROMORPHONE HYDROCHLORIDE 1 MG/ML
0.5 INJECTION, SOLUTION INTRAMUSCULAR; INTRAVENOUS; SUBCUTANEOUS EVERY 2 HOUR PRN
Status: ACTIVE | OUTPATIENT
Start: 2019-05-08 | End: 2019-05-09

## 2019-05-08 RX ORDER — POLYETHYLENE GLYCOL 3350 17 G/17G
17 POWDER, FOR SOLUTION ORAL DAILY PRN
Status: DISCONTINUED | OUTPATIENT
Start: 2019-05-08 | End: 2019-05-10

## 2019-05-08 RX ORDER — ZOLPIDEM TARTRATE 10 MG/1
5 TABLET ORAL NIGHTLY PRN
Status: DISCONTINUED | OUTPATIENT
Start: 2019-05-08 | End: 2019-05-10

## 2019-05-08 RX ORDER — SODIUM CHLORIDE 9 MG/ML
INJECTION, SOLUTION INTRAVENOUS CONTINUOUS
Status: ACTIVE | OUTPATIENT
Start: 2019-05-08 | End: 2019-05-09

## 2019-05-08 RX ORDER — HYDROCODONE BITARTRATE AND ACETAMINOPHEN 5; 325 MG/1; MG/1
1 TABLET ORAL EVERY 4 HOURS PRN
Status: DISCONTINUED | OUTPATIENT
Start: 2019-05-08 | End: 2019-05-10

## 2019-05-08 RX ORDER — CHLORAL HYDRATE 500 MG
1000 CAPSULE ORAL DAILY
COMMUNITY
End: 2021-03-05

## 2019-05-08 RX ORDER — KETOROLAC TROMETHAMINE 30 MG/ML
15 INJECTION, SOLUTION INTRAMUSCULAR; INTRAVENOUS ONCE
Status: COMPLETED | OUTPATIENT
Start: 2019-05-08 | End: 2019-05-08

## 2019-05-08 RX ORDER — BISACODYL 10 MG
10 SUPPOSITORY, RECTAL RECTAL
Status: DISCONTINUED | OUTPATIENT
Start: 2019-05-08 | End: 2019-05-10

## 2019-05-08 RX ORDER — CIPROFLOXACIN 500 MG/1
500 TABLET, FILM COATED ORAL 2 TIMES DAILY
Qty: 20 TABLET | Refills: 0 | Status: ON HOLD | OUTPATIENT
Start: 2019-05-08 | End: 2019-05-09

## 2019-05-08 RX ORDER — TAMSULOSIN HYDROCHLORIDE 0.4 MG/1
0.4 CAPSULE ORAL DAILY
Qty: 7 CAPSULE | Refills: 0 | Status: SHIPPED | OUTPATIENT
Start: 2019-05-08 | End: 2019-05-15

## 2019-05-08 RX ORDER — MORPHINE SULFATE 4 MG/ML
4 INJECTION, SOLUTION INTRAMUSCULAR; INTRAVENOUS ONCE
Status: COMPLETED | OUTPATIENT
Start: 2019-05-08 | End: 2019-05-08

## 2019-05-08 RX ORDER — SODIUM PHOSPHATE, DIBASIC AND SODIUM PHOSPHATE, MONOBASIC 7; 19 G/133ML; G/133ML
1 ENEMA RECTAL ONCE AS NEEDED
Status: DISCONTINUED | OUTPATIENT
Start: 2019-05-08 | End: 2019-05-10

## 2019-05-08 RX ORDER — HYDROCODONE BITARTRATE AND ACETAMINOPHEN 5; 325 MG/1; MG/1
2 TABLET ORAL EVERY 4 HOURS PRN
Status: DISCONTINUED | OUTPATIENT
Start: 2019-05-08 | End: 2019-05-10

## 2019-05-08 RX ORDER — METOCLOPRAMIDE HYDROCHLORIDE 5 MG/ML
10 INJECTION INTRAMUSCULAR; INTRAVENOUS EVERY 8 HOURS PRN
Status: DISCONTINUED | OUTPATIENT
Start: 2019-05-08 | End: 2019-05-10

## 2019-05-08 RX ORDER — LOSARTAN POTASSIUM 25 MG/1
25 TABLET ORAL DAILY
COMMUNITY
End: 2020-06-08

## 2019-05-08 RX ORDER — ONDANSETRON 2 MG/ML
4 INJECTION INTRAMUSCULAR; INTRAVENOUS ONCE
Status: COMPLETED | OUTPATIENT
Start: 2019-05-08 | End: 2019-05-08

## 2019-05-08 RX ORDER — DOCUSATE SODIUM 100 MG/1
100 CAPSULE, LIQUID FILLED ORAL 2 TIMES DAILY
Status: DISCONTINUED | OUTPATIENT
Start: 2019-05-08 | End: 2019-05-10

## 2019-05-08 RX ORDER — MORPHINE SULFATE 4 MG/ML
4 INJECTION, SOLUTION INTRAMUSCULAR; INTRAVENOUS EVERY 2 HOUR PRN
Status: DISCONTINUED | OUTPATIENT
Start: 2019-05-08 | End: 2019-05-09

## 2019-05-08 RX ORDER — SODIUM CHLORIDE 9 MG/ML
1000 INJECTION, SOLUTION INTRAVENOUS ONCE
Status: COMPLETED | OUTPATIENT
Start: 2019-05-08 | End: 2019-05-08

## 2019-05-08 RX ORDER — MORPHINE SULFATE 4 MG/ML
2 INJECTION, SOLUTION INTRAMUSCULAR; INTRAVENOUS EVERY 2 HOUR PRN
Status: DISCONTINUED | OUTPATIENT
Start: 2019-05-08 | End: 2019-05-10

## 2019-05-08 NOTE — ED INITIAL ASSESSMENT (HPI)
Abd pain right lower abd, pain radiates down to the right testicle, diarrhea started at 2 am x 4, no fevers, but has had chills, pain also radiates to right flank, \"I have also had kidney stones in the past.\"

## 2019-05-09 ENCOUNTER — ANESTHESIA EVENT (OUTPATIENT)
Dept: SURGERY | Facility: HOSPITAL | Age: 39
DRG: 661 | End: 2019-05-09
Payer: COMMERCIAL

## 2019-05-09 ENCOUNTER — APPOINTMENT (OUTPATIENT)
Dept: GENERAL RADIOLOGY | Facility: HOSPITAL | Age: 39
DRG: 661 | End: 2019-05-09
Attending: UROLOGY
Payer: COMMERCIAL

## 2019-05-09 ENCOUNTER — ANESTHESIA (OUTPATIENT)
Dept: SURGERY | Facility: HOSPITAL | Age: 39
DRG: 661 | End: 2019-05-09
Payer: COMMERCIAL

## 2019-05-09 PROCEDURE — 0T768DZ DILATION OF RIGHT URETER WITH INTRALUMINAL DEVICE, VIA NATURAL OR ARTIFICIAL OPENING ENDOSCOPIC: ICD-10-PCS | Performed by: UROLOGY

## 2019-05-09 PROCEDURE — 0TC68ZZ EXTIRPATION OF MATTER FROM RIGHT URETER, VIA NATURAL OR ARTIFICIAL OPENING ENDOSCOPIC: ICD-10-PCS | Performed by: UROLOGY

## 2019-05-09 DEVICE — STENT URET 6F 28CM ULSMTH: Type: IMPLANTABLE DEVICE | Site: URETER | Status: FUNCTIONAL

## 2019-05-09 RX ORDER — CEFAZOLIN SODIUM/WATER 2 G/20 ML
2 SYRINGE (ML) INTRAVENOUS
Status: DISPENSED | OUTPATIENT
Start: 2019-05-09 | End: 2019-05-10

## 2019-05-09 RX ORDER — METOCLOPRAMIDE HYDROCHLORIDE 5 MG/ML
10 INJECTION INTRAMUSCULAR; INTRAVENOUS AS NEEDED
Status: DISCONTINUED | OUTPATIENT
Start: 2019-05-09 | End: 2019-05-09 | Stop reason: HOSPADM

## 2019-05-09 RX ORDER — NALOXONE HYDROCHLORIDE 0.4 MG/ML
80 INJECTION, SOLUTION INTRAMUSCULAR; INTRAVENOUS; SUBCUTANEOUS AS NEEDED
Status: DISCONTINUED | OUTPATIENT
Start: 2019-05-09 | End: 2019-05-09 | Stop reason: HOSPADM

## 2019-05-09 RX ORDER — DEXTROSE MONOHYDRATE 25 G/50ML
50 INJECTION, SOLUTION INTRAVENOUS
Status: DISCONTINUED | OUTPATIENT
Start: 2019-05-09 | End: 2019-05-09 | Stop reason: HOSPADM

## 2019-05-09 RX ORDER — KETOROLAC TROMETHAMINE 30 MG/ML
30 INJECTION, SOLUTION INTRAMUSCULAR; INTRAVENOUS EVERY 8 HOURS PRN
Status: DISCONTINUED | OUTPATIENT
Start: 2019-05-09 | End: 2019-05-10

## 2019-05-09 RX ORDER — HYDROCODONE BITARTRATE AND ACETAMINOPHEN 5; 325 MG/1; MG/1
1 TABLET ORAL AS NEEDED
Status: DISCONTINUED | OUTPATIENT
Start: 2019-05-09 | End: 2019-05-09 | Stop reason: HOSPADM

## 2019-05-09 RX ORDER — KETOROLAC TROMETHAMINE 30 MG/ML
INJECTION, SOLUTION INTRAMUSCULAR; INTRAVENOUS
Status: DISPENSED
Start: 2019-05-09 | End: 2019-05-10

## 2019-05-09 RX ORDER — HYDROMORPHONE HYDROCHLORIDE 1 MG/ML
0.4 INJECTION, SOLUTION INTRAMUSCULAR; INTRAVENOUS; SUBCUTANEOUS EVERY 5 MIN PRN
Status: DISCONTINUED | OUTPATIENT
Start: 2019-05-09 | End: 2019-05-09 | Stop reason: HOSPADM

## 2019-05-09 RX ORDER — DIAZEPAM 10 MG/1
10 TABLET ORAL ONCE
Status: COMPLETED | OUTPATIENT
Start: 2019-05-09 | End: 2019-05-09

## 2019-05-09 RX ORDER — DEXAMETHASONE SODIUM PHOSPHATE 4 MG/ML
4 VIAL (ML) INJECTION AS NEEDED
Status: DISCONTINUED | OUTPATIENT
Start: 2019-05-09 | End: 2019-05-09 | Stop reason: HOSPADM

## 2019-05-09 RX ORDER — SODIUM CHLORIDE, SODIUM LACTATE, POTASSIUM CHLORIDE, CALCIUM CHLORIDE 600; 310; 30; 20 MG/100ML; MG/100ML; MG/100ML; MG/100ML
INJECTION, SOLUTION INTRAVENOUS CONTINUOUS
Status: DISCONTINUED | OUTPATIENT
Start: 2019-05-09 | End: 2019-05-09 | Stop reason: HOSPADM

## 2019-05-09 RX ORDER — HYDROCODONE BITARTRATE AND ACETAMINOPHEN 5; 325 MG/1; MG/1
2 TABLET ORAL AS NEEDED
Status: DISCONTINUED | OUTPATIENT
Start: 2019-05-09 | End: 2019-05-09 | Stop reason: HOSPADM

## 2019-05-09 RX ORDER — ONDANSETRON 2 MG/ML
4 INJECTION INTRAMUSCULAR; INTRAVENOUS AS NEEDED
Status: DISCONTINUED | OUTPATIENT
Start: 2019-05-09 | End: 2019-05-09 | Stop reason: HOSPADM

## 2019-05-09 RX ORDER — CEFAZOLIN SODIUM/WATER 2 G/20 ML
SYRINGE (ML) INTRAVENOUS
Status: DISCONTINUED | OUTPATIENT
Start: 2019-05-09 | End: 2019-05-09

## 2019-05-09 RX ORDER — KETOROLAC TROMETHAMINE 30 MG/ML
30 INJECTION, SOLUTION INTRAMUSCULAR; INTRAVENOUS EVERY 8 HOURS
Status: DISCONTINUED | OUTPATIENT
Start: 2019-05-09 | End: 2019-05-09

## 2019-05-09 RX ORDER — INSULIN ASPART 100 [IU]/ML
INJECTION, SOLUTION INTRAVENOUS; SUBCUTANEOUS ONCE
Status: DISCONTINUED | OUTPATIENT
Start: 2019-05-09 | End: 2019-05-09 | Stop reason: HOSPADM

## 2019-05-09 RX ORDER — IBUPROFEN 600 MG/1
600 TABLET ORAL ONCE AS NEEDED
Status: DISCONTINUED | OUTPATIENT
Start: 2019-05-09 | End: 2019-05-09 | Stop reason: HOSPADM

## 2019-05-09 RX ORDER — LABETALOL HYDROCHLORIDE 5 MG/ML
5 INJECTION, SOLUTION INTRAVENOUS EVERY 5 MIN PRN
Status: DISCONTINUED | OUTPATIENT
Start: 2019-05-09 | End: 2019-05-09 | Stop reason: HOSPADM

## 2019-05-09 NOTE — PLAN OF CARE
Patient back from OR. Complaints of burning with urination. Denies any nausea. VSS. POC with patient. All questions and concerns addressed. Will continue to monitor.

## 2019-05-09 NOTE — OPERATIVE REPORT
[facility]  Brief Op Note    Trini Medal Location: OR   CSN [id number] MRN [id number]   Admission Date 5/8/2019 Operation Date 5/9/2019   Attending Physician Kimberley Valdez* Operating Physician Anum Owens MD     Pre-Operative Diag

## 2019-05-09 NOTE — PROGRESS NOTES
Pharmacy Note: Dietary Supplement Discontinuation Per Policy    Ubiquinol has been discontinued on Julianna Kevin per policy. This supplement may be restarted upon discharge using the medication reconciliation process.     Thank you,   Morris Webb,

## 2019-05-09 NOTE — ED INITIAL ASSESSMENT (HPI)
Pt reports right flank pain since 0230. Stone diagnosed at [facility]. Reports he has 1.1 cm obstructing stone. Taking flomax and cipro. Reports increased pain and vomiting. Got toradol at 1430.

## 2019-05-09 NOTE — ED PROVIDER NOTES
Patient Seen in: [facility] Emergency Department    History   Patient presents with:  Abdomen/Flank Pain (GI/)    Stated Complaint:     HPI    CHIEF COMPLAINT: Right flank pain, vomiting    HISTORY OF PRESENT ILLNESS: Patient is a 42-year-old male 2013    wisdom teeth extraction           Social History    Tobacco Use      Smoking status: Never Smoker      Smokeless tobacco: Never Used    Alcohol use: No    Drug use: No      Review of Systems    Positive for stated complaint:   Other systems are as Ish Hedrick MD on 5/08/2019 at 13:55     Approved by: Jenifer Nguyen MD            I reviewed the patient's laboratory work-up and work-up from the immediate care.   Patient did have an elevated white blood cell count and urinalysis which revealed blood, ketones but n

## 2019-05-09 NOTE — PROGRESS NOTES
[facility]  Progress Note     Fuentes Ellison Patient Status:  Inpatient    1980 MRN [id number]   [facility] 3NW-A Attending [facility] Day # 1 PCP Candice Ulloa PA-C     Chief Complaint: Right flank p Daily with breakfast   • docusate sodium  100 mg Oral BID   • Insulin Aspart Pen  1-5 Units Subcutaneous TID CC and HS       ASSESSMENT / PLAN:     1.  Right ureteral stone 11 mm in size  -Continue IV hydration and n.p.o. after midnight for possible cystosc

## 2019-05-09 NOTE — CONSULTS
[facility] Urology   Consultation Note    Hayden Borja Patient Status:  Inpatient    1980 MRN [id number]   [facility] 3NW-A Attending Arely Whitehead [facility] Day # 1 PCP Armida Amezquita PA-C     Reason for Consu linked to her arthritis medication.    • Diabetes Father    • Heart Disease Father    • Heart Attack Father    • Dementia Paternal Grandmother    • Macular degeneration Paternal Grandmother    • No Known Problems Other    • Breast Cancer Maternal Aunt Intravenous, Q15 Min PRN **OR** glucose (DEX4) oral liquid 30 g, 30 g, Oral, Q15 Min PRN **OR** Glucose-Vitamin C (DEX-4) 4-6 GM-MG chewable tab 8 tablet, 8 tablet, Oral, Q15 Min PRN  •  Insulin Aspart Pen (NOVOLOG) 100 UNIT/ML flexpen 1-5 Units, 1-5 Units perinephric stranding and fluid. ADRENALS:  No mass or enlargement. AORTA/VASCULAR:  No aneurysm. RETROPERITONEUM:  No mass or adenopathy. BOWEL/MESENTERY:  Normal caliber small and large bowel including the appendix.   Few scattered diverticula and kidney to evaluate the anatomy with fluoroscopic x-ray. Lasers are often used to treat any obstruction such as stone or stricture.  In most cases, the goal is complete removal of stone, occasionally this is not possible or indicated and in those cases t

## 2019-05-09 NOTE — ANESTHESIA PREPROCEDURE EVALUATION
PRE-OP EVALUATION    Patient Name: Reino Luis    Pre-op Diagnosis: Ureteral calculi [N20.1]    Procedure(s):  CYSTOSCOPY, RIGHT RETROGRADE, PYELOGRAM, POSSIBLE RIGHT URETEROSCOPY WITH LASER LITHOTRISPY.  INSERTION OF RIGHT URETERAL STENT     Surgeon(s) Q2H PRN   Or      morphINE sulfate (PF) 4 MG/ML injection 4 mg 4 mg Intravenous Q2H PRN   acetaminophen (TYLENOL) tab 650 mg 650 mg Oral Q4H PRN   Or      HYDROcodone-acetaminophen (NORCO) 5-325 MG per tab 1 tablet 1 tablet Oral Q4H PRN   Or      HYDROcodo Cardiovascular                (+) obesity     (+) hyperlipidemia                                  Endo/Other      (+) diabetes                            Pulmonary                    (+) sleep apnea       Neuro/Psych                                    Pa

## 2019-05-09 NOTE — PLAN OF CARE
Problem: PAIN - ADULT  Goal: Verbalizes/displays adequate comfort level or patient's stated pain goal  Description  INTERVENTIONS:  - Encourage pt to monitor pain and request assistance  - Assess pain using appropriate pain scale  - Administer analgesics b patient's ability to be responsible for managing their own health  - Refer to Case Management Department for coordinating discharge planning if the patient needs post-hospital services based on physician/LIP order or complex needs related to functional sta

## 2019-05-09 NOTE — PAYOR COMM NOTE
--------------  CONTINUED STAY REVIEW    Payor: [employer]  Subscriber #:  [id number]  Authorization Number: [id number]    Admit date: 5/8/19  Admit time: 2237    Admitting Physician: Allen Aldridge MD  Attending Physician:  Harsh Fisher Zolpidem Tartrate (AMBIEN) tab 5 mg, 5 mg, Oral, Nightly PRN  •  morphINE sulfate (PF) 4 MG/ML injection 1 mg, 1 mg, Intravenous, Q2H PRN **OR** morphINE sulfate (PF) 4 MG/ML injection 2 mg, 2 mg, Intravenous, Q2H PRN **OR** morphINE sulfate (PF) 4 MG/ML i CO2 28.0 05/08/2019      05/08/2019     CA 9.2 05/08/2019     ALB 4.0 05/08/2019     ALKPHO 86 05/08/2019     BILT 0.7 05/08/2019     TP 7.7 05/08/2019     AST 31 05/08/2019     ALT 34 05/08/2019     PGLU 126 05/09/2019      UA 5/8/19: nitrite negat cancer     FH: CAD (coronary artery disease)     New onset type 2 diabetes mellitus ([facility] [address]     Family history of malignant neoplasm of digestive organ     Obstructive sleep apnea syndrome     Controlled type 2 diabetes mellitus without complication, without thoroughly discussed with the patient.      In light of the clinically significant right UPJ calculus, recommend a cystourethroscopy, right retrograde pyelogram, possible right ureteroscopy with laser lithotripsy, and insertion of a right ureteral stent.

## 2019-05-09 NOTE — H&P
[facility]  History and Physical     Cristofer Kaiser Patient Status:  Inpatient    1980 MRN [id number]   [facility] 3NW-A Attending Pamela Boland MD   Hosp Day # 0 PCP Randa Broussard PA-C     Chief Complaint: Right flank Allergies    Medications:    No current facility-administered medications on file prior to encounter.    Current Outpatient Medications on File Prior to Encounter:  tamsulosin HCl (FLOMAX) 0.4 MG Oral Cap Take 1 capsule (0.4 mg total) by mouth daily for 7 d intact. Musculoskeletal: Moves all extremities. Extremities: No edema or cyanosis. Integument: No rashes or lesions. Psychiatric: Appropriate mood and affect.       Diagnostic Data:      Labs:  Recent Labs   Lab 05/08/19  1315   MCV 86.5       Recent L

## 2019-05-09 NOTE — ANESTHESIA POSTPROCEDURE EVALUATION
[facility] Patient Status:  Inpatient   Age/Gender 41year old male MRN [id number]   [facility] SURGERY Attending VIGNESH Simon 21 Day # 1 PCP Taryn Donaldson PA-C       Anesthesia Post-op Note    Proc

## 2019-05-09 NOTE — PLAN OF CARE
Problem: Diabetes/Glucose Control  Goal: Glucose maintained within prescribed range  Description  INTERVENTIONS:  - Monitor Blood Glucose as ordered  - Assess for signs and symptoms of hyperglycemia and hypoglycemia  - Administer ordered medications to m pt/family on patient safety including physical limitations  - Instruct pt to call for assistance with activity based on assessment  - Modify environment to reduce risk of injury  - Provide assistive devices as appropriate  - Consider OT/PT consult to varinder

## 2019-05-09 NOTE — PAYOR COMM NOTE
--------------  ADMISSION REVIEW     Payor: [facility]  Subscriber #:  [id number]  Authorization Number: [id number]    Admit date: 5/8/19  Admit time: 2237       Admitting Physician: Parisa Horner MD  Attending Physician:  Krissy Rockwell social history elements is as listed in today's nurse's notes are reviewed and agree. The patient's family history is reviewed and is noncontributory to the presenting problem, except as indicated as above.   Review of Systems    Positive for stated complai appendicitis. Dictated by: Bushra Laird MD on 5/08/2019 at 13:55     Approved by: Bushra Laird MD            I reviewed the patient's laboratory work-up and work-up from the immediate care.   Patient did have an elevated white blood cell count and urinalysis Attending Alexis Cazares MD   Hosp Day # 0 PCP Adwoa Locke PA-C     Chief Complaint: Right flank pain nausea vomiting    History of Present Illness: Reedsvilledavid Phan is a 41year old male with history of nephrolithiasis presents to emergency room with mg/dL). No results for input(s): PTP, INR in the last 168 hours. No results for input(s): TROP, CK in the last 168 hours. Imaging: Imaging data reviewed in Epic. ASSESSMENT / PLAN:     1.  Right ureteral stone 11 mm in size  -Continue IV hydra

## 2019-05-09 NOTE — ED NOTES
Report given to floor RN Valeriy Vaughan. Patient and family updated on bed assignment. Patient awaiting transportation.

## 2019-05-09 NOTE — ED NOTES
I reviewed that chart and discussed the case.   I have examined the patient and noted patient is a 31-year-old male with a history of kidney stones in the past who presents emergency room with a history of some right-sided flank pain which started around 21 Patient had repeat blood work drawn here in the emergency room of the patient's basic metabolic profile was unremarkable as was the patient's white blood cell count and urinalysis showed only blood at the immediate care.   The patient was given IV fluids an

## 2019-05-10 ENCOUNTER — APPOINTMENT (OUTPATIENT)
Dept: GENERAL RADIOLOGY | Facility: HOSPITAL | Age: 39
DRG: 661 | End: 2019-05-10
Attending: PHYSICIAN ASSISTANT
Payer: COMMERCIAL

## 2019-05-10 VITALS
SYSTOLIC BLOOD PRESSURE: 130 MMHG | WEIGHT: 271.31 LBS | TEMPERATURE: 99 F | HEART RATE: 75 BPM | OXYGEN SATURATION: 96 % | HEIGHT: 74 IN | RESPIRATION RATE: 16 BRPM | BODY MASS INDEX: 34.82 KG/M2 | DIASTOLIC BLOOD PRESSURE: 76 MMHG

## 2019-05-10 PROCEDURE — 99239 HOSP IP/OBS DSCHRG MGMT >30: CPT | Performed by: HOSPITALIST

## 2019-05-10 PROCEDURE — 74018 RADEX ABDOMEN 1 VIEW: CPT | Performed by: PHYSICIAN ASSISTANT

## 2019-05-10 RX ORDER — PHENAZOPYRIDINE HYDROCHLORIDE 100 MG/1
100 TABLET, FILM COATED ORAL 3 TIMES DAILY PRN
Qty: 15 TABLET | Refills: 1 | Status: SHIPPED | OUTPATIENT
Start: 2019-05-10 | End: 2019-05-20

## 2019-05-10 RX ORDER — HYDROCODONE BITARTRATE AND ACETAMINOPHEN 5; 325 MG/1; MG/1
1 TABLET ORAL EVERY 6 HOURS PRN
Qty: 20 TABLET | Refills: 0 | Status: SHIPPED | OUTPATIENT
Start: 2019-05-10 | End: 2020-07-13 | Stop reason: ALTCHOICE

## 2019-05-10 NOTE — PROGRESS NOTES
[facility]  Urology Progress Note    Elizabeth Holman Patient Status:  Inpatient    1980 MRN [id number]   [facility] 3NW-A Attending [facility] Day # 2 PCP Sondra Benson PA-C     Subjective:  Lencho Rodríguez

## 2019-05-10 NOTE — ED PROVIDER NOTES
Patient Seen in: THE [facility] [place]    History   Patient presents with:  Abdominal Pain    Stated Complaint: abd pain diar    HPI  42 yo M, known diabetic and known hx of kidney stones coming in with R LQ pain - sharp pain that woke him up a Current:/88   Pulse 67   Temp 98.9 °F (37.2 °C)   Resp 20   Ht 188 cm (6' 2\")   Wt 122.5 kg   SpO2 100%   BMI 34.67 kg/m²         Physical Exam  GEN: Not in any acute distress, making good conversation, answering appropriately   SKIN: No pallo daily for 7 days.           Dispense:  7 capsule          Refill:  0    Ct Appendix Abd/pel W Contrast (cpt=74177)    Result Date: 5/8/2019  PROCEDURE:  CT APPENDIX  ABD/PEL W CONTRAST (CPT=74177)  COMPARISON:  [place], CT UROGRAM(W+WO)(CPT=74178), 3/2 appropriate for patient age. BONES:  No bony lesion or fracture. Curvature of the thoraco G lumbar junction, convex to the right. LUNG BASES:  No visible pulmonary or pleural disease. CONCLUSION:  1.  Obstructing right 1.1 cm ureteral pelvic calcifi

## 2019-05-10 NOTE — PLAN OF CARE
Problem: Diabetes/Glucose Control  Goal: Glucose maintained within prescribed range  Description  INTERVENTIONS:  - Monitor Blood Glucose as ordered  - Assess for signs and symptoms of hyperglycemia and hypoglycemia  - Administer ordered medications to m increased pain with activity and pre-medicate as appropriate  Outcome: Progressing     Problem: SAFETY ADULT - FALL  Goal: Free from fall injury  Description  INTERVENTIONS:  - Assess pt frequently for physical needs  - Identify cognitive and physical defi

## 2019-05-10 NOTE — PAYOR COMM NOTE
--------------  CONTINUED STAY REVIEW    Payor: [facility]  Subscriber #:  [id number]  Authorization Number: [id number]    Admit date: 19  Admit time: 2237susita Medal Patient Status:  Inpatient    1980 MRN [id number]   Locat premix IV syringe     Date Action Dose Route User    5/9/2019 1606 Given 2 g Intravenous (Right Lower Arm) Jennifer Saxena MD      diazepam (VALIUM) tab 10 mg     Date Action Dose Route User    5/9/2019 2012 Given 10 mg Oral Olga Cohen, RN      doc Gerhardt Angelica, RN

## 2019-05-10 NOTE — RESPIRATORY THERAPY NOTE
CAMILA Equipment Usage Summary :            Set Mode :AUTO CPAP W FLEX          Usage in Hours:4;42          90% Pressure (EPAP) : 7.5           90% Insp Pressure (IPAP);           AHI : 0.2          Supplemental Oxygen :      LPM

## 2019-05-10 NOTE — OPERATIVE REPORT
[address]    PATIENT'S NAME: Chaim Laughlin   ATTENDING PHYSICIAN: Mague Lopes D.O.   OPERATING PHYSICIAN: Yamilet Bartlett M.D.    PATIENT ACCOUNT#:   [de-identified]    LOCATION:  [address] [place] [address]  MEDICAL RECORD #:   [id number]       DATE OF BIRTH: pieces. The largest piece was grasped, extracted, sent to Pathology for evaluation. With significant difficulty, over a wire, a 28 cm 6-Argentine stent was then passed. It was subsequently seen coiling in the right renal pelvis as well as urinary bladder.

## 2019-05-10 NOTE — PLAN OF CARE
Patient moaning, stating pain is 10/10, right testicle is \"unbearable\". Rn assessed testicle, soft, when touching, patient moaning states very painful, pain is 10/10. Fordyce given. Patient states pain much worse now. RN paged Dr. Francisco Gamez.  Waiting for call

## 2019-05-10 NOTE — PLAN OF CARE
Dr. Giang Persons notified of patients pain. Order for Valium x1 and tordal Q8 prn. Discussed with patient and wife. Will pass onto oncoming RN.

## 2019-05-13 ENCOUNTER — PATIENT OUTREACH (OUTPATIENT)
Dept: CASE MANAGEMENT | Age: 39
End: 2019-05-13

## 2019-05-13 ENCOUNTER — TELEPHONE (OUTPATIENT)
Dept: FAMILY MEDICINE CLINIC | Facility: CLINIC | Age: 39
End: 2019-05-13

## 2019-05-13 DIAGNOSIS — Z02.9 ENCOUNTERS FOR UNSPECIFIED ADMINISTRATIVE PURPOSE: ICD-10-CM

## 2019-05-13 NOTE — TELEPHONE ENCOUNTER
NCM spoke with pt for TCM today. Pt did not schedule a TCM HFU with NCM today and is high risk for readmission.   Pt states his seeing urology on 5/16/19 for stent removal.  Please f/up and try to scheduled appt be recommended date of 5/17/19 as pt would g

## 2019-05-13 NOTE — PROGRESS NOTES
Initial Post Discharge Follow Up   Discharge Date: 5/10/19  Contact Date: 5/13/2019    Consent Verification:  Assessment Completed With: Patient  HIPAA Verified?   Yes    Discharge Dx:   Right ureteral stone, s/p cystoscopy, right ureteroscopic stone ext tablet Rfl: 1   tamsulosin HCl (FLOMAX) 0.4 MG Oral Cap Take 1 capsule (0.4 mg total) by mouth daily for 7 days. Disp: 7 capsule Rfl: 0   Losartan Potassium 25 MG Oral Tab Take 25 mg by mouth daily.  Disp:  Rfl:    omega-3 fatty acids 1000 MG Oral Cap Take 315 [address] at Aniyah Jones [address] [place] [address] [phone]          PCP TCM/HFU appointment: scheduled at D/C within 7-14 days  no     NCM Reviewed/sched

## 2019-05-15 ENCOUNTER — TELEPHONE (OUTPATIENT)
Dept: FAMILY MEDICINE CLINIC | Facility: CLINIC | Age: 39
End: 2019-05-15

## 2019-05-15 RX ORDER — DIAZEPAM 5 MG/1
TABLET ORAL
Qty: 15 TABLET | Refills: 0 | Status: ON HOLD | OUTPATIENT
Start: 2019-05-15 | End: 2020-06-23

## 2019-05-15 NOTE — TELEPHONE ENCOUNTER
Med called to pharmacy and script pended for signature.   Pt's wife notified and verbalized understanding

## 2019-05-15 NOTE — DISCHARGE SUMMARY
[facility]PITAL[facility]  DISCHARGE SUMMARY     Matt Hdz Patient Status:  Inpatient    1980 MRN [id number]   [facility] 3NW-A Attending No att. providers found   Hosp Day # 2 PCP Ivory Herrera PA-C     Date of Admission: 2019 descriptions):  • None    Lab/Test results pending at Discharge:   · None    Consultants:  • Urology- Dr. Hamilton Self    Discharge Medication List:     Discharge Medications      START taking these medications      Instructions Prescription details   [facility] [phone], [address] [facility], [address] [place], [place] [address]    Phone:  [phone]   · Phenazopyridine HCl 100 MG Tabs     Please  your prescriptions at the location directed by your doctor or nurse    Bring a paper prescription for ea

## 2019-05-15 NOTE — TELEPHONE ENCOUNTER
I spoke with pt's wife and she states Hershal Adjutant had a bad night last night. He was having intense pain, a lot of blood in his urine and he was trying to hold off on taking his meds. He was nausea at work today and he had a flare up.   Ben Weaver states she had some

## 2019-05-16 ENCOUNTER — OFFICE VISIT (OUTPATIENT)
Dept: FAMILY MEDICINE CLINIC | Facility: CLINIC | Age: 39
End: 2019-05-16
Payer: COMMERCIAL

## 2019-05-16 VITALS
TEMPERATURE: 99 F | SYSTOLIC BLOOD PRESSURE: 104 MMHG | HEART RATE: 72 BPM | DIASTOLIC BLOOD PRESSURE: 78 MMHG | HEIGHT: 74 IN | WEIGHT: 270 LBS | BODY MASS INDEX: 34.65 KG/M2

## 2019-05-16 DIAGNOSIS — N20.0 KIDNEY STONE: Primary | ICD-10-CM

## 2019-05-16 DIAGNOSIS — E11.9 CONTROLLED TYPE 2 DIABETES MELLITUS WITHOUT COMPLICATION, WITHOUT LONG-TERM CURRENT USE OF INSULIN (HCC): ICD-10-CM

## 2019-05-16 PROBLEM — Z12.11 COLON CANCER SCREENING: Status: RESOLVED | Noted: 2018-08-18 | Resolved: 2019-05-16

## 2019-05-16 PROBLEM — R06.83 SNORING: Status: RESOLVED | Noted: 2018-08-18 | Resolved: 2019-05-16

## 2019-05-16 PROBLEM — R10.9 INTRACTABLE ABDOMINAL PAIN: Status: RESOLVED | Noted: 2019-05-08 | Resolved: 2019-05-16

## 2019-05-16 PROCEDURE — 99496 TRANSJ CARE MGMT HIGH F2F 7D: CPT | Performed by: FAMILY MEDICINE

## 2019-05-16 PROCEDURE — 1111F DSCHRG MED/CURRENT MED MERGE: CPT | Performed by: FAMILY MEDICINE

## 2019-05-16 NOTE — TELEPHONE ENCOUNTER
Patient is having a hard time sleeping due to pain from small kidney stones passing after surgical procedure. Requests Valium to help him sleep as some hydrocodone still at home. His follow-up tomorrow in the office.   Okay for Valium 5 mg 1 every 6 hours

## 2019-05-16 NOTE — PROGRESS NOTES
HPI:    Buffy Quesada is a 41year old male here today for hospital follow up.    He was discharged from Inpatient hospital, [facility] to Home   Admission Date: 5/8/19   Discharge Date: 5/10/19  Hospital Discharge Diagnoses (since 4/16/2019)   Non had lots of blood and lower right back pain. Srini Acuña did not seem to be working so call primary care provider and got Valium Valium did help him sleep last night.   Yesterday still blood in the urine and a clot past pain today is at a 3 without nausea, vomit 2018), Hemorrhoids (2014), Night sweats (2009), Pain in joints (1998), Painful urination (August 2018), Sleep apnea (September 2018), Sleep disturbance (2009), and Wears glasses (December 2014).     He  has a past surgical history that includes other (Bilat rashes, no suspicious lesions  HEENT: atraumatic, normocephalic, ears and throat are clear  EYES: PERRLA, EOMI, conjunctiva are clear  NECK: supple, no adenopathy, no bruits  CHEST: no chest tenderness  LUNGS: clear to auscultation  CARDIO: RRR without mur Transitional Care Management Certification:  I certify that the following are true:  Communication with the patient was made within 2 business days of discharge on date above   Medical Decision Making- Based on service period of discharge to 30 days:

## 2019-05-28 DIAGNOSIS — E11.9 CONTROLLED TYPE 2 DIABETES MELLITUS WITHOUT COMPLICATION, WITHOUT LONG-TERM CURRENT USE OF INSULIN (HCC): ICD-10-CM

## 2019-05-28 RX ORDER — METFORMIN HYDROCHLORIDE 750 MG/1
750 TABLET, EXTENDED RELEASE ORAL
Qty: 90 TABLET | Refills: 0 | Status: SHIPPED | OUTPATIENT
Start: 2019-05-28 | End: 2019-09-26

## 2019-06-29 DIAGNOSIS — E78.2 MIXED HYPERLIPIDEMIA: ICD-10-CM

## 2019-07-01 RX ORDER — ROSUVASTATIN CALCIUM 10 MG/1
TABLET, COATED ORAL
Qty: 90 TABLET | Refills: 1 | Status: SHIPPED | OUTPATIENT
Start: 2019-07-01 | End: 2019-11-16

## 2019-07-01 NOTE — TELEPHONE ENCOUNTER
Pt requesting refill of rosuvastatin , passed protocol , refill approved, sent to pharmacy:     Last Time Medication was Filled:  12/18    Last Office Visit with PCP: 12/18

## 2019-09-26 DIAGNOSIS — E11.9 CONTROLLED TYPE 2 DIABETES MELLITUS WITHOUT COMPLICATION, WITHOUT LONG-TERM CURRENT USE OF INSULIN (HCC): ICD-10-CM

## 2019-09-26 RX ORDER — METFORMIN HYDROCHLORIDE 750 MG/1
750 TABLET, EXTENDED RELEASE ORAL
Qty: 30 TABLET | Refills: 0 | Status: SHIPPED | OUTPATIENT
Start: 2019-09-26 | End: 2019-10-22

## 2019-09-26 NOTE — TELEPHONE ENCOUNTER
Pt requesting refill of METFORMIN, passed protocol , refill approved, sent to pharmacy.      Sent message with Rx for patient to schedule an appointment for further refills

## 2019-10-15 ENCOUNTER — LAB ENCOUNTER (OUTPATIENT)
Dept: LAB | Age: 39
End: 2019-10-15
Attending: FAMILY MEDICINE
Payer: COMMERCIAL

## 2019-10-15 DIAGNOSIS — N20.0 KIDNEY STONES: ICD-10-CM

## 2019-10-15 DIAGNOSIS — E11.9 CONTROLLED TYPE 2 DIABETES MELLITUS WITHOUT COMPLICATION, WITHOUT LONG-TERM CURRENT USE OF INSULIN (HCC): ICD-10-CM

## 2019-10-15 PROCEDURE — 82043 UR ALBUMIN QUANTITATIVE: CPT

## 2019-10-15 PROCEDURE — 80061 LIPID PANEL: CPT

## 2019-10-15 PROCEDURE — 83036 HEMOGLOBIN GLYCOSYLATED A1C: CPT

## 2019-10-15 PROCEDURE — 36415 COLL VENOUS BLD VENIPUNCTURE: CPT

## 2019-10-15 PROCEDURE — 80053 COMPREHEN METABOLIC PANEL: CPT

## 2019-10-15 PROCEDURE — 82570 ASSAY OF URINE CREATININE: CPT

## 2019-10-15 NOTE — PROGRESS NOTES
Will discuss at follow-up office visit possible Victoza since hemoglobin A1c is at 7.7. Lipids are at goal LDL less than 100 and CMP normal except for elevated blood sugar.   Sent to my chart

## 2019-10-18 ENCOUNTER — OFFICE VISIT (OUTPATIENT)
Dept: FAMILY MEDICINE CLINIC | Facility: CLINIC | Age: 39
End: 2019-10-18
Payer: COMMERCIAL

## 2019-10-18 VITALS
HEIGHT: 74 IN | HEART RATE: 78 BPM | WEIGHT: 280 LBS | BODY MASS INDEX: 35.94 KG/M2 | SYSTOLIC BLOOD PRESSURE: 110 MMHG | DIASTOLIC BLOOD PRESSURE: 62 MMHG | TEMPERATURE: 99 F

## 2019-10-18 DIAGNOSIS — Z99.89 OSA ON CPAP: ICD-10-CM

## 2019-10-18 DIAGNOSIS — G47.33 OSA ON CPAP: ICD-10-CM

## 2019-10-18 DIAGNOSIS — E66.9 OBESITY (BMI 35.0-39.9 WITHOUT COMORBIDITY): ICD-10-CM

## 2019-10-18 DIAGNOSIS — E11.65 UNCONTROLLED TYPE 2 DIABETES MELLITUS WITH HYPERGLYCEMIA (HCC): ICD-10-CM

## 2019-10-18 DIAGNOSIS — D22.9 MULTIPLE PIGMENTED NEVI: ICD-10-CM

## 2019-10-18 DIAGNOSIS — F43.22 ADJUSTMENT REACTION WITH ANXIETY: ICD-10-CM

## 2019-10-18 DIAGNOSIS — Z23 NEED FOR VACCINATION: ICD-10-CM

## 2019-10-18 DIAGNOSIS — Z79.899 MEDICATION MANAGEMENT: ICD-10-CM

## 2019-10-18 DIAGNOSIS — Z00.00 WELL ADULT EXAM: Primary | ICD-10-CM

## 2019-10-18 PROBLEM — N20.0 KIDNEY STONE: Status: RESOLVED | Noted: 2019-05-08 | Resolved: 2019-10-18

## 2019-10-18 PROBLEM — E11.9 CONTROLLED TYPE 2 DIABETES MELLITUS WITHOUT COMPLICATION, WITHOUT LONG-TERM CURRENT USE OF INSULIN (HCC): Status: RESOLVED | Noted: 2018-12-21 | Resolved: 2019-10-18

## 2019-10-18 PROBLEM — Z87.442 HISTORY OF NEPHROLITHIASIS: Status: ACTIVE | Noted: 2019-10-18

## 2019-10-18 PROCEDURE — 99214 OFFICE O/P EST MOD 30 MIN: CPT | Performed by: FAMILY MEDICINE

## 2019-10-18 PROCEDURE — 90686 IIV4 VACC NO PRSV 0.5 ML IM: CPT | Performed by: FAMILY MEDICINE

## 2019-10-18 PROCEDURE — 90471 IMMUNIZATION ADMIN: CPT | Performed by: FAMILY MEDICINE

## 2019-10-18 PROCEDURE — 90632 HEPA VACCINE ADULT IM: CPT | Performed by: FAMILY MEDICINE

## 2019-10-18 PROCEDURE — 90472 IMMUNIZATION ADMIN EACH ADD: CPT | Performed by: FAMILY MEDICINE

## 2019-10-18 PROCEDURE — 99395 PREV VISIT EST AGE 18-39: CPT | Performed by: FAMILY MEDICINE

## 2019-10-18 RX ORDER — PEN NEEDLE, DIABETIC 30 GX3/16"
1 NEEDLE, DISPOSABLE MISCELLANEOUS DAILY
Qty: 90 EACH | Refills: 0 | Status: SHIPPED | OUTPATIENT
Start: 2019-10-18 | End: 2020-01-15

## 2019-10-18 RX ORDER — PEN NEEDLE, DIABETIC 30 GX3/16"
1 NEEDLE, DISPOSABLE MISCELLANEOUS DAILY
Qty: 90 EACH | Refills: 0 | Status: SHIPPED
Start: 2019-10-18 | End: 2021-09-26 | Stop reason: ALTCHOICE

## 2019-10-18 NOTE — PROGRESS NOTES
Lele Nix is a 42year old male who presents for a complete physical exam.   HPI:   CPE, follow up DM and recent labs  Well adult exam  Needs labs  Last colonoscopy FH mother colon cancer due to RA RX had colonoscopy done is due 12/17/23.   Urination mg/dL    Triglycerides 149 30 - 149 mg/dL    LDL Cholesterol 97 <100 mg/dL    VLDL 30 0 - 30 mg/dL    Non HDL Chol 127 <130 mg/dL   HEMOGLOBIN A1C   Result Value Ref Range    HgbA1C 7.7 (H) <5.7 %    Estimated Average Glucose 174 (H) 68 - 126 mg/dL       A -American 126 >=60    AST 32 15 - 37 U/L    ALT 37 16 - 61 U/L    Alkaline Phosphatase 92 45 - 117 U/L    Bilirubin, Total 0.6 0.1 - 2.0 mg/dL    Total Protein 7.8 6.4 - 8.2 g/dL    Albumin 4.2 3.4 - 5.0 g/dL    Globulin  3.6 2.8 - 4.4 g/dL    A/G R fatty acids 1000 MG Oral Cap, Take 1,000 mg by mouth daily. , Disp: , Rfl:   Ubiquinol 100 MG Oral Cap, Take 1 capsule by mouth daily. , Disp: , Rfl:   Multiple Vitamin (MULTI-VITAMIN DAILY) Oral Tab, Take 1 tablet by mouth daily.   , Disp: , Rfl:   Pawan Wood vision  HENT: denies upper respiratory symptoms  LUNGS: denies LAKEISHA, wheezing, cough, orthopnea and PND  CARDIOVASCULAR: denies CP, palpitations, rapid or slow heart rate.  Denies COULTER/lower extremity swelling  GI: denies abdominal pain,denies heartburn, dayan clubbing or edema  NEURO: A&O x3, CN II-XII grossly intact. Reflexes: biceps and patellar 2+ b/l and symmetric.  Gait is normal.  PSYCH: normal affect, no apparent thought disorder, average judgement and insight      Immunization History  Administered Solution Pen-injector 3 pen 2     Sig: Inject 1.8 mg into the skin daily. • Insulin Pen Needle (PEN NEEDLES) 32G X 4 MM Does not apply Misc 90 each 0     Si each by Does not apply route daily.        Imaging & Consults:   NAVIGATOR  OP REFERRAL DIAB small frequent meals of healthy combinations of protein and carbohydrate. Avoiding packaged/processed. 5. CAMILA on CPAP  C/w CPAP    6. Multiple pigmented nevi  - DERM - INTERNAL    7.  Need for vaccination  - IMMUNIZATION ADMINISTRATION  - HEPATITIS A

## 2019-10-22 DIAGNOSIS — E11.9 CONTROLLED TYPE 2 DIABETES MELLITUS WITHOUT COMPLICATION, WITHOUT LONG-TERM CURRENT USE OF INSULIN (HCC): ICD-10-CM

## 2019-10-22 RX ORDER — METFORMIN HYDROCHLORIDE 750 MG/1
750 TABLET, EXTENDED RELEASE ORAL
Qty: 90 TABLET | Refills: 1 | Status: ON HOLD | OUTPATIENT
Start: 2019-10-22 | End: 2020-06-24

## 2019-10-23 ENCOUNTER — TELEPHONE (OUTPATIENT)
Dept: ENDOCRINOLOGY CLINIC | Facility: CLINIC | Age: 39
End: 2019-10-23

## 2019-10-31 ENCOUNTER — TELEPHONE (OUTPATIENT)
Dept: FAMILY MEDICINE CLINIC | Facility: CLINIC | Age: 39
End: 2019-10-31

## 2019-10-31 NOTE — TELEPHONE ENCOUNTER
Received CMN form Jeramie March for patient's CPAP supplies. Patient had a sleep study done on 10/18/2018 but has not been seen in our office by Dr. Angus Carmichael or Gina Streeter. LM for patient to return call.

## 2019-11-02 NOTE — TELEPHONE ENCOUNTER
Future Appointments   Date Time Provider Ileana Waleska   11/11/2019 10:30 AM Nathaniel Bray APRN [facility]   11/11/2019  2:00 PM Mala Smith RN, CDE [facility]

## 2019-11-07 DIAGNOSIS — E11.65 UNCONTROLLED TYPE 2 DIABETES MELLITUS WITH HYPERGLYCEMIA (HCC): Primary | ICD-10-CM

## 2019-11-07 RX ORDER — LANCETS
1 EACH MISCELLANEOUS DAILY
Qty: 100 EACH | Refills: 3 | Status: SHIPPED | OUTPATIENT
Start: 2019-11-07 | End: 2020-05-15

## 2019-11-11 ENCOUNTER — NURSE ONLY (OUTPATIENT)
Dept: ENDOCRINOLOGY CLINIC | Facility: CLINIC | Age: 39
End: 2019-11-11
Payer: COMMERCIAL

## 2019-11-11 VITALS — SYSTOLIC BLOOD PRESSURE: 122 MMHG | WEIGHT: 273.63 LBS | BODY MASS INDEX: 35 KG/M2 | DIASTOLIC BLOOD PRESSURE: 84 MMHG

## 2019-11-11 DIAGNOSIS — E11.65 UNCONTROLLED TYPE 2 DIABETES MELLITUS WITH HYPERGLYCEMIA (HCC): ICD-10-CM

## 2019-11-11 PROCEDURE — G0108 DIAB MANAGE TRN  PER INDIV: HCPCS | Performed by: DIETITIAN, REGISTERED

## 2019-11-13 NOTE — PROGRESS NOTES
Michelle Federico  : 1980 attended Step 1 Diabetic Education:    Date: 2019  Referring Provider: Estella Smalls PA-C  Start time: 2pm End time: 3pm    /84 (BP Location: Right arm, Patient Position: Sitting, Cuff Size: adult)   Wt 273 l response to food in the gut thereby decreasing post-prandial BG levels. Common side effects are dizziness, nausea, diarrhea. Contact PCP immediately if experiencing severe abd pain;may cause Pancreatitis. If dose missed, wait until next scheduled dose.

## 2019-11-16 DIAGNOSIS — E78.2 MIXED HYPERLIPIDEMIA: ICD-10-CM

## 2019-11-18 RX ORDER — ROSUVASTATIN CALCIUM 10 MG/1
10 TABLET, COATED ORAL NIGHTLY
Qty: 90 TABLET | Refills: 2 | Status: SHIPPED | OUTPATIENT
Start: 2019-11-18 | End: 2020-03-16

## 2019-11-18 NOTE — TELEPHONE ENCOUNTER
Pt requesting refill of ROSUVASTATIN CALCIUM 10 MG Oral Tab , passed protocol , refill approved, sent to pharmacy.

## 2019-11-21 ENCOUNTER — OFFICE VISIT (OUTPATIENT)
Dept: FAMILY MEDICINE CLINIC | Facility: CLINIC | Age: 39
End: 2019-11-21
Payer: COMMERCIAL

## 2019-11-21 VITALS
BODY MASS INDEX: 35.06 KG/M2 | SYSTOLIC BLOOD PRESSURE: 110 MMHG | WEIGHT: 273.19 LBS | TEMPERATURE: 97 F | HEART RATE: 80 BPM | DIASTOLIC BLOOD PRESSURE: 68 MMHG | RESPIRATION RATE: 18 BRPM | HEIGHT: 74 IN | OXYGEN SATURATION: 96 %

## 2019-11-21 DIAGNOSIS — Z99.89 OSA ON CPAP: Primary | ICD-10-CM

## 2019-11-21 DIAGNOSIS — G47.33 OSA ON CPAP: Primary | ICD-10-CM

## 2019-11-21 PROCEDURE — 99213 OFFICE O/P EST LOW 20 MIN: CPT | Performed by: NURSE PRACTITIONER

## 2019-11-21 NOTE — PROGRESS NOTES
[facility]        HPI:   This is a 42year old male coming in for Patient presents with:  Obstructive Sleep Apnea (CAMILA): compliance      HPI:     Patient present recheck on sleep therapy. Has been using his CPAP.  Has use: Yes      Alcohol/week: 1.0 standard drinks      Types: 1 Glasses of wine per week      Frequency: 2-3 times a week      Drinks per session: 3 or 4      Comment: red wine or bourban    Drug use: No    Family History:  Family History   Problem Relation omega-3 fatty acids 1000 MG Oral Cap Take 1,000 mg by mouth daily. • Multiple Vitamin (MULTI-VITAMIN DAILY) Oral Tab Take 1 tablet by mouth daily. • Cetirizine HCl (ZYRTEC ALLERGY) 10 MG Oral Tab Take 10 mg by mouth daily.      • Ubiquinol 100 MG Mal 3, tonsils 1   Eyes: Conjunctivae are normal.   Neck: Normal range of motion. Neck supple. No thyromegaly present. Cardiovascular: Normal rate and regular rhythm. Exam reveals no friction rub. No murmur heard.   Pulmonary/Chest: Effort normal and Advised patient to change filters,masks,hoses  and tubes and equiptment on a  regular schedule.   Filters and seals shall be changed every 1 month,  Hoses every 3 months,   CPAP mask and humidifier  chamber changed every 6 month  with the Durable medica

## 2019-12-06 DIAGNOSIS — E78.2 MIXED HYPERLIPIDEMIA: ICD-10-CM

## 2019-12-06 DIAGNOSIS — E11.65 UNCONTROLLED TYPE 2 DIABETES MELLITUS WITH HYPERGLYCEMIA (HCC): ICD-10-CM

## 2019-12-09 DIAGNOSIS — E11.65 UNCONTROLLED TYPE 2 DIABETES MELLITUS WITH HYPERGLYCEMIA (HCC): ICD-10-CM

## 2019-12-09 NOTE — TELEPHONE ENCOUNTER
Pt requesting refill of Liraglutide (VICTOZA) 18 MG/3ML Subcutaneous Solution Pen-injector, refill approved, sent to pharmacy

## 2020-01-15 DIAGNOSIS — E11.65 UNCONTROLLED TYPE 2 DIABETES MELLITUS WITH HYPERGLYCEMIA (HCC): ICD-10-CM

## 2020-01-15 RX ORDER — PEN NEEDLE, DIABETIC 30 GX3/16"
1 NEEDLE, DISPOSABLE MISCELLANEOUS DAILY
Qty: 90 EACH | Refills: 0 | Status: SHIPPED | OUTPATIENT
Start: 2020-01-15 | End: 2020-04-30

## 2020-01-23 ENCOUNTER — TELEPHONE (OUTPATIENT)
Dept: FAMILY MEDICINE CLINIC | Facility: CLINIC | Age: 40
End: 2020-01-23

## 2020-01-23 NOTE — TELEPHONE ENCOUNTER
Patients wife states patient is having shooting pain in left flank. Pain is occasional.  No nausea, fever, or pain with urination. He is afraid it is a kidney stone starting. He is wondering if he can take flomax again to help. Stone analysis;     Calcul

## 2020-01-24 ENCOUNTER — HOSPITAL ENCOUNTER (OUTPATIENT)
Dept: CT IMAGING | Facility: HOSPITAL | Age: 40
Discharge: HOME | End: 2020-01-24
Attending: FAMILY MEDICINE
Payer: COMMERCIAL

## 2020-01-24 ENCOUNTER — TELEPHONE (OUTPATIENT)
Dept: FAMILY MEDICINE CLINIC | Facility: CLINIC | Age: 40
End: 2020-01-24

## 2020-01-24 DIAGNOSIS — Z87.442 HISTORY OF KIDNEY STONES: ICD-10-CM

## 2020-01-24 DIAGNOSIS — R10.9 ACUTE LEFT FLANK PAIN: ICD-10-CM

## 2020-01-24 DIAGNOSIS — R10.9 ACUTE LEFT FLANK PAIN: Primary | ICD-10-CM

## 2020-01-24 PROCEDURE — 74176 CT ABD & PELVIS W/O CONTRAST: CPT | Performed by: FAMILY MEDICINE

## 2020-01-24 RX ORDER — TAMSULOSIN HYDROCHLORIDE 0.4 MG/1
0.4 CAPSULE ORAL DAILY
Qty: 20 CAPSULE | Refills: 0 | OUTPATIENT
Start: 2020-01-24 | End: 2020-02-23

## 2020-01-24 RX ORDER — ACETAMINOPHEN AND CODEINE PHOSPHATE 300; 30 MG/1; MG/1
1 TABLET ORAL EVERY 4 HOURS PRN
Qty: 20 TABLET | Refills: 0 | Status: ON HOLD | OUTPATIENT
Start: 2020-01-24 | End: 2020-06-23

## 2020-01-24 RX ORDER — AMOXICILLIN AND CLAVULANATE POTASSIUM 875; 125 MG/1; MG/1
1 TABLET, FILM COATED ORAL 2 TIMES DAILY
Qty: 20 TABLET | Refills: 0 | Status: SHIPPED | OUTPATIENT
Start: 2020-01-24 | End: 2020-02-03

## 2020-01-24 NOTE — TELEPHONE ENCOUNTER
Results discussed with patient's wife, Janice Vivas. Results of stat CT of abdomen pelvis reports acute diverticulitis of left colon. Reports also hydronephrosis on the right but less compared to May 2019. Calcifications in proximal right ureter.   Plan: Shilpi Officer

## 2020-01-24 NOTE — TELEPHONE ENCOUNTER
Spoke with wife, pt c/o left flank pain, severe, colicky all day yesterday,last night, and this morning. No fever. No n/v. No difficulty urinating. Positive h/o kidney stone with obstruction requiring surgery. Staff will contact pt to schedule stat.

## 2020-02-03 ENCOUNTER — TELEPHONE (OUTPATIENT)
Dept: FAMILY MEDICINE CLINIC | Facility: CLINIC | Age: 40
End: 2020-02-03

## 2020-02-03 NOTE — TELEPHONE ENCOUNTER
Jesus Manuel Gudino to get update he has had a cough for the past 3 days no fevers but some chills and subtle body aches knows to this is a expiratory wheeze intermittently with the cough. Has been using Mucinex.   Does have a nebulizer at home with albuterol stat

## 2020-02-03 NOTE — TELEPHONE ENCOUNTER
Santa Natarajan is calling to see what Khushi Child think about Gisela Gonzalez needing to be evaluated, they were just on a cruise to [facility] [place], and they returned on Sat, Gisela Gonzalez has developed a cough, he does not have a fever, just a cough, she is just concerned because it developed s

## 2020-03-16 DIAGNOSIS — E78.2 MIXED HYPERLIPIDEMIA: ICD-10-CM

## 2020-03-17 RX ORDER — ROSUVASTATIN CALCIUM 10 MG/1
10 TABLET, COATED ORAL NIGHTLY
Qty: 90 TABLET | Refills: 2 | Status: SHIPPED | OUTPATIENT
Start: 2020-03-17 | End: 2020-07-23

## 2020-04-30 DIAGNOSIS — E11.65 UNCONTROLLED TYPE 2 DIABETES MELLITUS WITH HYPERGLYCEMIA (HCC): ICD-10-CM

## 2020-04-30 RX ORDER — PEN NEEDLE, DIABETIC 30 GX3/16"
1 NEEDLE, DISPOSABLE MISCELLANEOUS DAILY
Qty: 100 EACH | Refills: 0 | Status: SHIPPED | OUTPATIENT
Start: 2020-04-30 | End: 2020-11-29

## 2020-05-14 ENCOUNTER — PATIENT MESSAGE (OUTPATIENT)
Dept: FAMILY MEDICINE CLINIC | Facility: CLINIC | Age: 40
End: 2020-05-14

## 2020-05-14 DIAGNOSIS — E11.65 UNCONTROLLED TYPE 2 DIABETES MELLITUS WITH HYPERGLYCEMIA (HCC): ICD-10-CM

## 2020-05-15 RX ORDER — LANCETS
1 EACH MISCELLANEOUS DAILY
Qty: 100 EACH | Refills: 2 | Status: SHIPPED | OUTPATIENT
Start: 2020-05-15 | End: 2021-05-15

## 2020-05-15 RX ORDER — BLOOD SUGAR DIAGNOSTIC
STRIP MISCELLANEOUS
Qty: 100 STRIP | Refills: 2 | Status: SHIPPED | OUTPATIENT
Start: 2020-05-15 | End: 2021-02-09

## 2020-05-15 NOTE — TELEPHONE ENCOUNTER
From: Alex Cost  To: Adwoa Locke PA-C  Sent: 5/14/2020 8:53 PM CDT  Subject: Prescription Question    I was switched over to an ACCU-CHEK Guide glucometer and am out of strips.  I think my old meter is still listed in my chart and will need the

## 2020-06-08 RX ORDER — LOSARTAN POTASSIUM 25 MG/1
TABLET ORAL
Qty: 30 TABLET | Refills: 0 | Status: SHIPPED | OUTPATIENT
Start: 2020-06-08 | End: 2020-07-01

## 2020-06-08 NOTE — TELEPHONE ENCOUNTER
Pt requesting refill of   LOSARTAN POTASSIUM 25 MG Oral Tab           Last Time Medication was Filled:12/28/19    Last Office Visit with Provider: 10/18/19  No future appointments.

## 2020-06-22 ENCOUNTER — HOSPITAL ENCOUNTER (OUTPATIENT)
Age: 40
Discharge: ANOTHER HOSPITAL | End: 2020-06-22
Attending: FAMILY MEDICINE
Payer: COMMERCIAL

## 2020-06-22 ENCOUNTER — HOSPITAL ENCOUNTER (INPATIENT)
Facility: HOSPITAL | Age: 40
LOS: 1 days | Discharge: HOME | DRG: 661 | End: 2020-06-24
Attending: EMERGENCY MEDICINE | Admitting: INTERNAL MEDICINE
Payer: COMMERCIAL

## 2020-06-22 ENCOUNTER — APPOINTMENT (OUTPATIENT)
Dept: CT IMAGING | Age: 40
End: 2020-06-22
Attending: FAMILY MEDICINE
Payer: COMMERCIAL

## 2020-06-22 VITALS
TEMPERATURE: 99 F | HEIGHT: 74 IN | HEART RATE: 75 BPM | WEIGHT: 265 LBS | OXYGEN SATURATION: 97 % | SYSTOLIC BLOOD PRESSURE: 148 MMHG | BODY MASS INDEX: 34.01 KG/M2 | RESPIRATION RATE: 20 BRPM | DIASTOLIC BLOOD PRESSURE: 90 MMHG

## 2020-06-22 DIAGNOSIS — N20.0 BILATERAL KIDNEY STONES: Primary | ICD-10-CM

## 2020-06-22 DIAGNOSIS — N20.1 BILATERAL URETERAL CALCULI: ICD-10-CM

## 2020-06-22 DIAGNOSIS — N12 PYELONEPHRITIS OF LEFT KIDNEY: ICD-10-CM

## 2020-06-22 DIAGNOSIS — N13.30 BILATERAL HYDRONEPHROSIS: Primary | ICD-10-CM

## 2020-06-22 DIAGNOSIS — N20.1 BILATERAL URETERAL CALCULI: Primary | ICD-10-CM

## 2020-06-22 PROCEDURE — 99214 OFFICE O/P EST MOD 30 MIN: CPT

## 2020-06-22 PROCEDURE — 99215 OFFICE O/P EST HI 40 MIN: CPT

## 2020-06-22 PROCEDURE — 96361 HYDRATE IV INFUSION ADD-ON: CPT

## 2020-06-22 PROCEDURE — 96374 THER/PROPH/DIAG INJ IV PUSH: CPT

## 2020-06-22 PROCEDURE — 80047 BASIC METABLC PNL IONIZED CA: CPT

## 2020-06-22 PROCEDURE — 99223 1ST HOSP IP/OBS HIGH 75: CPT | Performed by: INTERNAL MEDICINE

## 2020-06-22 PROCEDURE — 85025 COMPLETE CBC W/AUTO DIFF WBC: CPT | Performed by: FAMILY MEDICINE

## 2020-06-22 PROCEDURE — 74177 CT ABD & PELVIS W/CONTRAST: CPT | Performed by: FAMILY MEDICINE

## 2020-06-22 PROCEDURE — 96375 TX/PRO/DX INJ NEW DRUG ADDON: CPT

## 2020-06-22 PROCEDURE — 82565 ASSAY OF CREATININE: CPT

## 2020-06-22 RX ORDER — ONDANSETRON 4 MG/1
4 TABLET, ORALLY DISINTEGRATING ORAL ONCE
Status: DISCONTINUED | OUTPATIENT
Start: 2020-06-22 | End: 2020-06-22

## 2020-06-22 RX ORDER — ONDANSETRON 2 MG/ML
4 INJECTION INTRAMUSCULAR; INTRAVENOUS EVERY 6 HOURS PRN
Status: DISCONTINUED | OUTPATIENT
Start: 2020-06-22 | End: 2020-06-24

## 2020-06-22 RX ORDER — MORPHINE SULFATE 4 MG/ML
4 INJECTION, SOLUTION INTRAMUSCULAR; INTRAVENOUS EVERY 2 HOUR PRN
Status: DISCONTINUED | OUTPATIENT
Start: 2020-06-22 | End: 2020-06-24

## 2020-06-22 RX ORDER — HYDROMORPHONE HYDROCHLORIDE 1 MG/ML
0.5 INJECTION, SOLUTION INTRAMUSCULAR; INTRAVENOUS; SUBCUTANEOUS EVERY 30 MIN PRN
Status: DISCONTINUED | OUTPATIENT
Start: 2020-06-22 | End: 2020-06-24

## 2020-06-22 RX ORDER — MAGNESIUM OXIDE 400 MG (241.3 MG MAGNESIUM) TABLET
1 TABLET NIGHTLY PRN
Status: DISCONTINUED | OUTPATIENT
Start: 2020-06-22 | End: 2020-06-24

## 2020-06-22 RX ORDER — ONDANSETRON 2 MG/ML
4 INJECTION INTRAMUSCULAR; INTRAVENOUS EVERY 4 HOURS PRN
Status: DISCONTINUED | OUTPATIENT
Start: 2020-06-22 | End: 2020-06-24

## 2020-06-22 RX ORDER — HYDROCODONE BITARTRATE AND ACETAMINOPHEN 5; 325 MG/1; MG/1
2 TABLET ORAL EVERY 4 HOURS PRN
Status: DISCONTINUED | OUTPATIENT
Start: 2020-06-22 | End: 2020-06-24

## 2020-06-22 RX ORDER — SODIUM CHLORIDE 9 MG/ML
INJECTION, SOLUTION INTRAVENOUS ONCE
Status: COMPLETED | OUTPATIENT
Start: 2020-06-22 | End: 2020-06-22

## 2020-06-22 RX ORDER — ONDANSETRON 2 MG/ML
4 INJECTION INTRAMUSCULAR; INTRAVENOUS ONCE
Status: COMPLETED | OUTPATIENT
Start: 2020-06-22 | End: 2020-06-22

## 2020-06-22 RX ORDER — KETOROLAC TROMETHAMINE 30 MG/ML
15 INJECTION, SOLUTION INTRAMUSCULAR; INTRAVENOUS ONCE
Status: COMPLETED | OUTPATIENT
Start: 2020-06-22 | End: 2020-06-22

## 2020-06-22 RX ORDER — ACETAMINOPHEN 325 MG/1
650 TABLET ORAL EVERY 4 HOURS PRN
Status: DISCONTINUED | OUTPATIENT
Start: 2020-06-22 | End: 2020-06-24

## 2020-06-22 RX ORDER — SODIUM CHLORIDE 9 MG/ML
INJECTION, SOLUTION INTRAVENOUS CONTINUOUS
Status: DISCONTINUED | OUTPATIENT
Start: 2020-06-22 | End: 2020-06-24

## 2020-06-22 RX ORDER — DEXTROSE MONOHYDRATE 25 G/50ML
50 INJECTION, SOLUTION INTRAVENOUS
Status: DISCONTINUED | OUTPATIENT
Start: 2020-06-22 | End: 2020-06-24

## 2020-06-22 RX ORDER — SODIUM CHLORIDE 9 MG/ML
INJECTION, SOLUTION INTRAVENOUS CONTINUOUS
Status: ACTIVE | OUTPATIENT
Start: 2020-06-22 | End: 2020-06-23

## 2020-06-22 RX ORDER — MORPHINE SULFATE 4 MG/ML
1 INJECTION, SOLUTION INTRAMUSCULAR; INTRAVENOUS EVERY 2 HOUR PRN
Status: DISCONTINUED | OUTPATIENT
Start: 2020-06-22 | End: 2020-06-24

## 2020-06-22 RX ORDER — MORPHINE SULFATE 4 MG/ML
2 INJECTION, SOLUTION INTRAMUSCULAR; INTRAVENOUS EVERY 2 HOUR PRN
Status: DISCONTINUED | OUTPATIENT
Start: 2020-06-22 | End: 2020-06-24

## 2020-06-22 RX ORDER — POTASSIUM CHLORIDE 20 MEQ/1
40 TABLET, EXTENDED RELEASE ORAL ONCE
Status: COMPLETED | OUTPATIENT
Start: 2020-06-22 | End: 2020-06-22

## 2020-06-22 RX ORDER — HYDROMORPHONE HYDROCHLORIDE 1 MG/ML
0.5 INJECTION, SOLUTION INTRAMUSCULAR; INTRAVENOUS; SUBCUTANEOUS EVERY 30 MIN PRN
Status: ACTIVE | OUTPATIENT
Start: 2020-06-22 | End: 2020-06-23

## 2020-06-22 RX ORDER — HYDROCODONE BITARTRATE AND ACETAMINOPHEN 5; 325 MG/1; MG/1
1 TABLET ORAL EVERY 4 HOURS PRN
Status: DISCONTINUED | OUTPATIENT
Start: 2020-06-22 | End: 2020-06-24

## 2020-06-22 NOTE — ED INITIAL ASSESSMENT (HPI)
LMQ/LLQ began yesterday evening. Hx/o diverticulitis 1/20. No fever.  Last BM this morning \"normal\" c/o nausea and vomiting

## 2020-06-23 ENCOUNTER — ANESTHESIA EVENT (OUTPATIENT)
Dept: SURGERY | Facility: HOSPITAL | Age: 40
DRG: 661 | End: 2020-06-23
Payer: COMMERCIAL

## 2020-06-23 ENCOUNTER — APPOINTMENT (OUTPATIENT)
Dept: GENERAL RADIOLOGY | Facility: HOSPITAL | Age: 40
DRG: 661 | End: 2020-06-23
Attending: UROLOGY
Payer: COMMERCIAL

## 2020-06-23 ENCOUNTER — ANESTHESIA (OUTPATIENT)
Dept: SURGERY | Facility: HOSPITAL | Age: 40
DRG: 661 | End: 2020-06-23
Payer: COMMERCIAL

## 2020-06-23 PROBLEM — N23 RENAL COLIC ON LEFT SIDE: Status: ACTIVE | Noted: 2020-06-23

## 2020-06-23 PROBLEM — E11.9 TYPE 2 DIABETES MELLITUS (HCC): Status: ACTIVE | Noted: 2018-09-12

## 2020-06-23 PROBLEM — N39.0 UTI (URINARY TRACT INFECTION): Status: ACTIVE | Noted: 2020-06-23

## 2020-06-23 PROBLEM — N13.2 HYDRONEPHROSIS WITH URINARY OBSTRUCTION DUE TO URETERAL CALCULUS: Status: ACTIVE | Noted: 2020-06-23

## 2020-06-23 PROCEDURE — 0T788DZ DILATION OF BILATERAL URETERS WITH INTRALUMINAL DEVICE, VIA NATURAL OR ARTIFICIAL OPENING ENDOSCOPIC: ICD-10-PCS | Performed by: UROLOGY

## 2020-06-23 PROCEDURE — 99232 SBSQ HOSP IP/OBS MODERATE 35: CPT | Performed by: HOSPITALIST

## 2020-06-23 PROCEDURE — 0TC68ZZ EXTIRPATION OF MATTER FROM RIGHT URETER, VIA NATURAL OR ARTIFICIAL OPENING ENDOSCOPIC: ICD-10-PCS | Performed by: UROLOGY

## 2020-06-23 PROCEDURE — 0TC78ZZ EXTIRPATION OF MATTER FROM LEFT URETER, VIA NATURAL OR ARTIFICIAL OPENING ENDOSCOPIC: ICD-10-PCS | Performed by: UROLOGY

## 2020-06-23 PROCEDURE — BT1DYZZ FLUOROSCOPY OF RIGHT KIDNEY, URETER AND BLADDER USING OTHER CONTRAST: ICD-10-PCS | Performed by: UROLOGY

## 2020-06-23 DEVICE — URETERAL STENT
Type: IMPLANTABLE DEVICE | Site: URETER | Status: NON-FUNCTIONAL
Brand: ASCERTA™
Removed: 2020-07-20

## 2020-06-23 RX ORDER — KETOROLAC TROMETHAMINE 15 MG/ML
15 INJECTION, SOLUTION INTRAMUSCULAR; INTRAVENOUS EVERY 6 HOURS PRN
Status: DISCONTINUED | OUTPATIENT
Start: 2020-06-23 | End: 2020-06-24

## 2020-06-23 RX ORDER — HYDROCODONE BITARTRATE AND ACETAMINOPHEN 10; 325 MG/1; MG/1
1 TABLET ORAL AS NEEDED
Status: DISCONTINUED | OUTPATIENT
Start: 2020-06-23 | End: 2020-06-23 | Stop reason: HOSPADM

## 2020-06-23 RX ORDER — MIDAZOLAM HYDROCHLORIDE 1 MG/ML
1 INJECTION INTRAMUSCULAR; INTRAVENOUS EVERY 5 MIN PRN
Status: DISCONTINUED | OUTPATIENT
Start: 2020-06-23 | End: 2020-06-23 | Stop reason: HOSPADM

## 2020-06-23 RX ORDER — HYDROCODONE BITARTRATE AND ACETAMINOPHEN 10; 325 MG/1; MG/1
2 TABLET ORAL AS NEEDED
Status: DISCONTINUED | OUTPATIENT
Start: 2020-06-23 | End: 2020-06-23 | Stop reason: HOSPADM

## 2020-06-23 RX ORDER — MEPERIDINE HYDROCHLORIDE 25 MG/ML
12.5 INJECTION INTRAMUSCULAR; INTRAVENOUS; SUBCUTANEOUS AS NEEDED
Status: DISCONTINUED | OUTPATIENT
Start: 2020-06-23 | End: 2020-06-23 | Stop reason: HOSPADM

## 2020-06-23 RX ORDER — PHENAZOPYRIDINE HYDROCHLORIDE 200 MG/1
200 TABLET, FILM COATED ORAL 3 TIMES DAILY PRN
Status: DISCONTINUED | OUTPATIENT
Start: 2020-06-23 | End: 2020-06-24

## 2020-06-23 RX ORDER — CEFAZOLIN SODIUM/WATER 2 G/20 ML
2 SYRINGE (ML) INTRAVENOUS EVERY 8 HOURS
Status: DISCONTINUED | OUTPATIENT
Start: 2020-06-23 | End: 2020-06-24

## 2020-06-23 RX ORDER — SODIUM CHLORIDE, SODIUM LACTATE, POTASSIUM CHLORIDE, CALCIUM CHLORIDE 600; 310; 30; 20 MG/100ML; MG/100ML; MG/100ML; MG/100ML
INJECTION, SOLUTION INTRAVENOUS CONTINUOUS
Status: DISCONTINUED | OUTPATIENT
Start: 2020-06-23 | End: 2020-06-23 | Stop reason: HOSPADM

## 2020-06-23 RX ORDER — DEXTROSE MONOHYDRATE 25 G/50ML
50 INJECTION, SOLUTION INTRAVENOUS
Status: DISCONTINUED | OUTPATIENT
Start: 2020-06-23 | End: 2020-06-23 | Stop reason: HOSPADM

## 2020-06-23 RX ORDER — NALOXONE HYDROCHLORIDE 0.4 MG/ML
80 INJECTION, SOLUTION INTRAMUSCULAR; INTRAVENOUS; SUBCUTANEOUS AS NEEDED
Status: DISCONTINUED | OUTPATIENT
Start: 2020-06-23 | End: 2020-06-23 | Stop reason: HOSPADM

## 2020-06-23 RX ORDER — ACETAMINOPHEN 500 MG
1000 TABLET ORAL ONCE AS NEEDED
Status: DISCONTINUED | OUTPATIENT
Start: 2020-06-23 | End: 2020-06-23 | Stop reason: HOSPADM

## 2020-06-23 RX ORDER — HYDROMORPHONE HYDROCHLORIDE 1 MG/ML
0.4 INJECTION, SOLUTION INTRAMUSCULAR; INTRAVENOUS; SUBCUTANEOUS EVERY 5 MIN PRN
Status: DISCONTINUED | OUTPATIENT
Start: 2020-06-23 | End: 2020-06-23 | Stop reason: HOSPADM

## 2020-06-23 RX ORDER — LIDOCAINE HYDROCHLORIDE 10 MG/ML
INJECTION, SOLUTION EPIDURAL; INFILTRATION; INTRACAUDAL; PERINEURAL AS NEEDED
Status: DISCONTINUED | OUTPATIENT
Start: 2020-06-23 | End: 2020-06-23 | Stop reason: SURG

## 2020-06-23 RX ORDER — SODIUM CHLORIDE, SODIUM LACTATE, POTASSIUM CHLORIDE, CALCIUM CHLORIDE 600; 310; 30; 20 MG/100ML; MG/100ML; MG/100ML; MG/100ML
INJECTION, SOLUTION INTRAVENOUS CONTINUOUS PRN
Status: DISCONTINUED | OUTPATIENT
Start: 2020-06-23 | End: 2020-06-23 | Stop reason: SURG

## 2020-06-23 RX ORDER — ONDANSETRON 2 MG/ML
4 INJECTION INTRAMUSCULAR; INTRAVENOUS AS NEEDED
Status: DISCONTINUED | OUTPATIENT
Start: 2020-06-23 | End: 2020-06-23 | Stop reason: HOSPADM

## 2020-06-23 RX ORDER — KETOROLAC TROMETHAMINE 30 MG/ML
30 INJECTION, SOLUTION INTRAMUSCULAR; INTRAVENOUS ONCE
Status: COMPLETED | OUTPATIENT
Start: 2020-06-23 | End: 2020-06-23

## 2020-06-23 RX ADMIN — CEFAZOLIN SODIUM/WATER 2 G: 2 G/20 ML SYRINGE (ML) INTRAVENOUS at 15:30:00

## 2020-06-23 RX ADMIN — SODIUM CHLORIDE, SODIUM LACTATE, POTASSIUM CHLORIDE, CALCIUM CHLORIDE: 600; 310; 30; 20 INJECTION, SOLUTION INTRAVENOUS at 15:22:00

## 2020-06-23 RX ADMIN — LIDOCAINE HYDROCHLORIDE 40 MG: 10 INJECTION, SOLUTION EPIDURAL; INFILTRATION; INTRACAUDAL; PERINEURAL at 15:24:00

## 2020-06-23 RX ADMIN — ONDANSETRON 4 MG: 2 INJECTION INTRAMUSCULAR; INTRAVENOUS at 16:41:00

## 2020-06-23 NOTE — ED INITIAL ASSESSMENT (HPI)
Pt presents to ed from immediate care with dx of bilateral hydronephrosis and pyelonephritis of the left kidney. Pt states pain on arrival is a 2/10 to the left flank.  Pt states pain started yesterday evening while he was at rest. Pt states hx of kidney st

## 2020-06-23 NOTE — PLAN OF CARE
New consent order noted. Consent for surgery signed & witnessed. Urine culture obtained & sent to lab, urine strained, no stones noted. Pharmacy notified that urine obtained & will time antibiotic.

## 2020-06-23 NOTE — ED PROVIDER NOTES
Patient Seen in: [address] [facility]      History   Patient presents with:  Abdomen/Flank Pain    Stated Complaint: abd pain lower left side hx: diverticulitis pain    HPI  42-year-old male with history of diabetes, now here with?   Diverticul HPI.  Constitutional and vital signs reviewed. All other systems reviewed and negative except as noted above.     Physical Exam     ED Triage Vitals [06/22/20 1721]   BP (!) 135/92   Pulse 96   Resp 16   Temp 98.7 °F (37.1 °C)   Temp src Temporal   SpO mm kidney stone noted on the L side and fat stranding in this region, with elevated white count suggests pyelonephritis as well.    Toradol and Zofran given IV while here in [address] IN THE ER      Disposition and [address]

## 2020-06-23 NOTE — RESPIRATORY THERAPY NOTE
CAMILA - Equipment Use Daily Summary:  · Set Mode   · Usage in hours: 5.6  · 90% Pressure (EPAP) level: 4.5  · 90% Insp Pressure (IPAP):   · AHI: 0  · Supplemental Oxygen:  · Comments:

## 2020-06-23 NOTE — CONSULTS
[facility]    Report of Consultation    Yuemary Singh Patient Status:  Observation    1980 MRN [id number]   [facility] 3NW-A Attending Gume Mckee DO   Hosp Day # 0 PCP Sharlene Gold DO     Date of Admission:  2020 believe it was linked to her arthritis medication.    • Diabetes Father    • Heart Disease Father    • Heart Attack Father    • Dementia Paternal Grandmother    • Macular degeneration Paternal Grandmother    • Diabetes Paternal Grandfather    • No Known Pro HPI.    Physical Exam:  /82 (BP Location: Right arm)   Pulse 95   Temp 97.3 °F (36.3 °C) (Oral)   Resp 18   Ht 6' 2\" (1.88 m)   Wt 265 lb (120.2 kg)   SpO2 94%   BMI 34.02 kg/m²   General appearance: alert, appears stated age, cooperative and no dis calcification. PANCREAS:  No lesion, fluid collection, ductal dilatation, or atrophy. SPLEEN:  No enlargement or focal lesion. KIDNEYS:  There is moderate right-sided hydronephrosis.   There are 2 obstructing stones within the right proximal ureter Impression:  Patient Active Problem List:     Mixed hyperlipidemia     Seasonal allergies     Onychomycosis of right great toe     Obesity (BMI 35.0-39.9 without comorbidity)     FH: colon cancer     FH: CAD (coronary artery disease)     Type 2 diabe SERGE  [facility]  6/23/2020  9:14 AM

## 2020-06-23 NOTE — PLAN OF CARE
NURSING ADMISSION NOTE      Patient admitted via Cart  Oriented to room. Safety precautions initiated. Bed in low position. Call light in reach. Database completed. Dr. Johnny Mcbride notified of PTA med list completed. Patient A/Ox4. RA. CAMILA, CPAP.  Kermit Goldberg See additional Care Plan goals for specific interventions   Outcome: Progressing

## 2020-06-23 NOTE — PROGRESS NOTES
[facility] HOSPITAL[facility]  Progress Note     Steen Oppenheim Patient Status:  Observation    1980 MRN [id number]   [facility] 3NW-A Attending Jena Tirado DO   Hosp Day # 0 PCP David Gilbert DO     Chief Complaint: Abdominal pain    S: P input(s): PTP, INR in the last 168 hours. No results for input(s): TROP, CK in the last 168 hours. Imaging: Imaging data reviewed in Epic.     Medications:   • Insulin Aspart Pen  1-10 Units Subcutaneous 4 times per day   • ceFAZolin  2 g Tirso Raymond

## 2020-06-23 NOTE — ANESTHESIA PREPROCEDURE EVALUATION
PRE-OP EVALUATION    Patient Name: Kristy Hardy    Pre-op Diagnosis: Bilateral ureteral calculi [N20.1]    Procedure(s):  cystoscopy, bilateral retrograde pyelogram, bilateral ureteroscopy, laser lithrotripsy, stent insertion- bilateral, stone extractio 25 MG/ML injection 12.5 mg, 12.5 mg, Intravenous, PRN  [COMPLETED] 0.9% NaCl infusion, , Intravenous, Once  [COMPLETED] ondansetron HCl (ZOFRAN) injection 4 mg, 4 mg, Intravenous, Once  [COMPLETED] iohexol (OMNIPAQUE) 350 MG/ML injection 100 mL, 100 mL, In Once        Outpatient Medications:  Liraglutide (VICTOZA SC), Inject into the skin., Disp: , Rfl:   LOSARTAN POTASSIUM 25 MG Oral Tab, TAKE 1 TABLET BY MOUTH EVERY DAY, Disp: 30 tablet, Rfl: 0  Glucose Blood (ACCU-CHEK GUIDE) In Vitro Strip, Check blood g wisdom teeth extraction     Social History    Tobacco Use      Smoking status: Never Smoker      Smokeless tobacco: Never Used    Alcohol use:  Yes      Alcohol/week: 1.0 standard drinks      Types: 1 Glasses of wine per week      Frequency: 2-3 times a wee

## 2020-06-23 NOTE — PROGRESS NOTES
[facility] Pharmacy Note: Antimicrobial Weight Based Dose Adjustment for: cefazolin (ANCEF)    Tonia Hodgson is a 42year old male who has been prescribed cefazolin (ANCEF) 1000 mg every 8 hours.     Estimated Creatinine Clearance: 89.1 mL/min (A) (based on SCr

## 2020-06-23 NOTE — OPERATIVE REPORT
[facility]  Brief Op Note    Elizabeth Ihchay Location: OR   [facility] [id number] MRN [id number]   Admission Date 6/22/2020 Operation Date 6/23/2020   Attending Physician Miguelito Fermin DO Operating Physician Franci Miles MD     Pre-Operative Diagnosis:

## 2020-06-23 NOTE — ED PROVIDER NOTES
Patient Seen in: [facility] Emergency Department      History   Patient presents with:  Back Pain    Stated Complaint: kidney stones with obsructions     HPI    22-year-old white male presents emerged from today for complaint of left flank pain.   Pa above.    Physical Exam     ED Triage Vitals [06/22/20 2047]   BP (!) 174/97   Pulse 81   Resp 18   Temp 96.8 °F (36 °C)   Temp src Temporal   SpO2 96 %   O2 Device None (Room air)       Current:/87   Pulse 85   Temp 96.8 °F (36 °C) (Temporal)   Resp LAVENDER   RAINBOW DRAW LIGHT GREEN   RAINBOW DRAW GOLD   RAPID SARS-COV-2 BY PCR          CT scan of the abdomen pelvis reveals:       FINDINGS:    LIVER:  No enlargement, atrophy, abnormal density, or significant focal lesion.     BILIARY:  No visible dil left kidney and delayed excretion of contrast which can be seen with superimposed pyelonephritis.   Findings discussed with Dr. Tamera Cisneros          [facility]     Patient had an IV established here in the emergency was given IV fluids and pain medications and had la

## 2020-06-23 NOTE — ANESTHESIA PROCEDURE NOTES
Airway  Urgency: elective      General Information and Staff    Patient location during procedure: OR  Anesthesiologist: Rubina Sage MD  Resident/CRNA: Maria D Fatima CRNA  Performed: CRNA     Indications and Patient Condition  Indications fo

## 2020-06-23 NOTE — H&P
[facility] HOSPITAL[facility]                                                               History & Physical         Graciella Sickle Patient Status:  Observation    1980 MRN [id number]   [facility] 3NW-A Attending Babak Morelos MD   [facility] Day • Heart Disease Father    • Heart Attack Father    • Dementia Paternal Grandmother    • Macular degeneration Paternal Grandmother    • Diabetes Paternal Grandfather    • No Known Problems Other    • Breast Cancer Maternal Aunt       Reviewed    Social hi (ZYRTEC ALLERGY) 10 MG Oral Tab, Take 10 mg by mouth daily. , Disp: , Rfl: , 6/21/2020 at Unknown time  Acetaminophen-Codeine (TYLENOL WITH CODEINE #3) 300-30 MG Oral Tab, Take 1 tablet by mouth every 4 (four) hours as needed for Pain.  (Patient not taking: Component Value Date    WBC 17.4 06/22/2020    HGB 15.1 06/22/2020    HCT 44.6 06/22/2020    .0 06/22/2020    CREATSERUM 1.33 06/22/2020    BUN 11 06/22/2020     06/22/2020    K 3.8 06/22/2020     06/22/2020    CO2 28.0 06/22/2020    G stranding which could all be due to the obstructed stone but early   pyelonephritis could also have this appearance. ADRENALS:  No mass or enlargement. AORTA/VASCULAR:  No aneurysm or dissection. RETROPERITONEUM:  No mass or adenopathy.     BOWEL/MES pyelonephritis  1. We will give IV antibiotics IV Rocephin  2.  Follow urine culture and sensitivity      Quality:  · DVT Prophylaxis: Early ambulation  · CODE status: full  · Patel: no    Plan of care discussed with patient      Discussed with ER Physician

## 2020-06-23 NOTE — ANESTHESIA POSTPROCEDURE EVALUATION
[facility] Patient Status:  Observation   Age/Gender 42year old male MRN [id number]   Location [address] Attending Ga Mauricio, [address] [facility] Day # 0 PCP Candice Jackson,        Anesthesia Post-op Not

## 2020-06-24 VITALS
TEMPERATURE: 98 F | RESPIRATION RATE: 22 BRPM | HEART RATE: 106 BPM | BODY MASS INDEX: 34.01 KG/M2 | HEIGHT: 74 IN | WEIGHT: 265 LBS | OXYGEN SATURATION: 93 % | SYSTOLIC BLOOD PRESSURE: 136 MMHG | DIASTOLIC BLOOD PRESSURE: 86 MMHG

## 2020-06-24 PROBLEM — N20.0 KIDNEY STONES: Status: ACTIVE | Noted: 2020-06-24

## 2020-06-24 PROCEDURE — 99239 HOSP IP/OBS DSCHRG MGMT >30: CPT | Performed by: HOSPITALIST

## 2020-06-24 RX ORDER — PHENAZOPYRIDINE HYDROCHLORIDE 100 MG/1
100 TABLET, FILM COATED ORAL 3 TIMES DAILY PRN
Qty: 15 TABLET | Refills: 0 | Status: SHIPPED | OUTPATIENT
Start: 2020-06-24 | End: 2020-07-04

## 2020-06-24 RX ORDER — CEPHALEXIN 500 MG/1
500 CAPSULE ORAL 2 TIMES DAILY
Qty: 4 CAPSULE | Refills: 0 | Status: SHIPPED | OUTPATIENT
Start: 2020-06-24 | End: 2020-06-30 | Stop reason: ALTCHOICE

## 2020-06-24 RX ORDER — HYDROCODONE BITARTRATE AND ACETAMINOPHEN 5; 325 MG/1; MG/1
1 TABLET ORAL EVERY 4 HOURS PRN
Qty: 30 TABLET | Refills: 0 | Status: SHIPPED | OUTPATIENT
Start: 2020-06-24 | End: 2020-06-30

## 2020-06-24 RX ORDER — OXYBUTYNIN CHLORIDE 5 MG/1
5 TABLET ORAL 3 TIMES DAILY
Qty: 30 TABLET | Refills: 0 | Status: SHIPPED | OUTPATIENT
Start: 2020-06-24 | End: 2021-03-05 | Stop reason: ALTCHOICE

## 2020-06-24 NOTE — RESPIRATORY THERAPY NOTE
CAMILA - Equipment Use Daily Summary:  · Set Mode AFLEX  · Usage in hours: 6:10  · 90% Pressure (EPAP) level: 11.0  · 90% Insp Pressure (IPAP):   · AHI: 2.2  · Supplemental Oxygen:   · Comments:

## 2020-06-24 NOTE — PROGRESS NOTES
[facility]    Progress Note    Junious Fuelling Patient Status:  Observation    1980 MRN [id number]   [facility] 3NW-A Attending Walter Bryant MD   Hosp Day # 0 PCP Janelle Powers DO         Subjective:   Junious Fuelling is a(n) 06/22/2020    ALT 26 06/22/2020    T4F 1.0 08/29/2018    TSH 1.420 08/29/2018    MG 2.3 06/23/2020                        Bess Reed PA-C  [facility]  6/24/2020

## 2020-06-24 NOTE — PLAN OF CARE
Pt returned from PACU. C/o urge to void &  rectal pressure . Insisted on going to bathroom to try voiding &  have a BM. Instructed not to strain. Urinated  50ml blood tinged urine, passed gas. Assisted back to bed & c/o severe pain, yelling & rates 10/10.

## 2020-06-24 NOTE — PLAN OF CARE
Patient is A/Ox4. RA, . CAMILA, CPAP. Tele- NSR. Voids, blood tinged urine, straining urine. Passing gas. Abdomen distended. Regular diet tolerated. Pain controlled with Toradol. Up ad ariana. IVF infusing. IV ABX. POC dicussed with patient.  All questions and

## 2020-06-24 NOTE — OPERATIVE REPORT
[facility]    PATIENT'S NAME: Marek May   ATTENDING PHYSICIAN: Maxine Mcneal M.D. OPERATING PHYSICIAN: Pritesh Peraza M.D.    PATIENT ACCOUNT#:   [de-identified]    LOCATION:  [address] [place] [address]  MEDICAL RECORD #:   [id number]       DATE OF BIRTH:  [birth year] of the stone. The stone was identified. It was imbedded in the wall of the ureter with significant difficulty. It was fragmented into pieces with the laser. Ultimately with difficulty, a large piece was able to be extracted.   The ureter was re-visualiz

## 2020-06-24 NOTE — PLAN OF CARE
Some relief of pain after morphine but still with urge to void. Dr Domonique Gloria notified, orders received.

## 2020-06-24 NOTE — PLAN OF CARE
Written and verbal discharge instructions given to patient and  verbalize understanding. IV discontinued in , angio-cath tip intact, site free from redness, swelling, or drainage, patient denies pain at site. Dressing applied. No prescription given.   Barbi

## 2020-06-24 NOTE — PROGRESS NOTES
[facility]  Progress Note     Melonie Wynn Patient Status:  Observation    1980 MRN [id number]   [facility] 3NW-A Attending Alecia Martinez DO   Hosp Day # 0 PCP Lisa Woodard DO     Chief Complaint: Abdominal pain    S: P results for input(s): TROP, CK in the last 168 hours. Imaging: Imaging data reviewed in Epic.     Medications:   • ceFAZolin  2 g Intravenous Q8H   • Insulin Aspart Pen  1-10 Units Subcutaneous TID CC and HS       ASSESSMENT / PLAN:     1. B/L nephr

## 2020-06-25 ENCOUNTER — TELEPHONE (OUTPATIENT)
Dept: FAMILY MEDICINE CLINIC | Facility: CLINIC | Age: 40
End: 2020-06-25

## 2020-06-25 ENCOUNTER — PATIENT OUTREACH (OUTPATIENT)
Dept: CASE MANAGEMENT | Age: 40
End: 2020-06-25

## 2020-06-25 DIAGNOSIS — Z02.9 ENCOUNTERS FOR UNSPECIFIED ADMINISTRATIVE PURPOSE: ICD-10-CM

## 2020-06-25 PROCEDURE — 1111F DSCHRG MED/CURRENT MED MERGE: CPT

## 2020-06-25 NOTE — DISCHARGE SUMMARY
[facility]OSPITAL[facility]  DISCHARGE SUMMARY     Arminda Hawthorne Patient Status:  Inpatient    1980 MRN [id number]   [facility] 3NW-A Attending No att. providers found   Hosp Day # 1 PCP Lan Barragan DO     Date of Admission: 2020  D findings and recommendations (brief descriptions):  • None    Lab/Test results pending at Discharge:   · None    Consultants:  • Urology    Discharge Medication List:     Discharge Medications      START taking these medications      Instructions Prescript Daily Tabs      Take 1 tablet by mouth daily. Refills:  0     omega-3 fatty acids 1000 MG Caps  Commonly known as:  FISH OIL      Take 1,000 mg by mouth daily. Refills:  0     Pen Needles 32G X 4 MM Misc      1 each by Does not apply route daily.    Suraj Villa min. [address] [facility] [address] [place], [place]    Patient Instructions:                          Vital signs:       Physical Exam:    General: No acute distress. Respiratory: Clear to auscultation bilaterally. No wheezes.  No rh

## 2020-06-25 NOTE — PROGRESS NOTES
Initial Post Discharge Follow Up   Discharge Date: 6/24/20  Contact Date: 6/25/2020    Consent Verification:  Assessment Completed With: Patient  HIPAA Verified?   Yes    Discharge Dx:   Bilateral kidney stone    Was TCC ordered: yes    General:   • How Subcutaneous Solution Pen-injector Inject 1.8 mg into the skin daily. • LOSARTAN POTASSIUM 25 MG Oral Tab TAKE 1 TABLET BY MOUTH EVERY DAY 30 tablet 0   • Glucose Blood (ACCU-CHEK GUIDE) In Vitro Strip Check blood glucose once daily.   E11.65 100 stri Needs post D/C:   Now that you are home, are there any needs or concerns you need addressed before your next visit with your PCP?  (DME, meds, disease concerns, Etc): No     Follow up appointments:      Your appointments     Date & Time Appointment Depa

## 2020-06-25 NOTE — TELEPHONE ENCOUNTER
Pt's wife called and said Kristen Ralph has a fever of 101, should they be concerned, what should they be doing?

## 2020-06-26 NOTE — PAYOR COMM NOTE
--------------  DISCHARGE REVIEW    Payor: Mirlande Valenzuela [facility]  Subscriber #:  [id number]  Authorization Number: [id number]    Admit date: 6/24/20  Admit time:  9895  Discharge Date: 6/24/2020  2:53 PM     Admitting Physician: Sukhwinder Joy MD  Attend placement with stone extraction. Patient is to return back in 2 weeks for evaluation of right kidney stones. He has been cleared for discharge home, his renal function has stabilized and is expected to improve with decompression.     Lace+ Score: 38  59-9 already taking a medication with the same name, and this prescription was added. Make sure you understand how and when to take each. Take 1 tablet by mouth every 4 (four) hours as needed.    Quantity:  30 tablet  Refills:  0        CONTINUE taking thes [address] [facility] [place] [address]    Phone:  [phone]   · cephALEXin 500 MG Caps  · HYDROcodone-acetaminophen 5-325 MG Tabs  · Oxybutynin Chloride 5 MG Tabs  · Phenazopyridine HCl 100 MG Tabs         [place]    Follow-u

## 2020-06-26 NOTE — PAYOR COMM NOTE
--------------  ADMISSION REVIEW     Payor: Tom SHAIKH  Subscriber #:  [id number]  Authorization Number: [id number]    Admit date: 6/24/20  Admit time: 18       Admitting Physician: Tien Gutiérrez MD  Attending Physician:  No att. providers Performed by Shanelle Modi MD at [facility] MAIN OR   • OTHER Bilateral 2013    wisdom teeth extraction                    Social History    Tobacco Use      Smoking status: Never Smoker      Smokeless tobacco: Never Used        Drug use:  No             Re The following orders were created for panel order CBC WITH DIFFERENTIAL WITH PLATELET.   Procedure                               Abnormality         Status                     ---------                               -----------         ------ PELVIC ORGANS:  No visible mass. Pelvic organs appropriate for patient age. BONES:  No bony lesion or fracture. LUNG BASES:  No visible pulmonary or pleural disease. OTHER:  Negative.        CONCLUSION:       Bilateral obstructing kidney stones wi Filed:  6/23/2020  1:54 AM Date of Service:  6/22/2020 10:59 PM Status:  Addendum    :  Maribel Pradhan MD (Physician)    Related Notes:  Original Note by Maribel Pradhan MD (Physician) filed at 6/23/2020  1:50 AM         Ord HOSPITALIST Family History   Problem Relation Age of Onset   • Arthritis Mother         rheumatoid   • Cancer Mother         colon   • Colon Cancer Mother         We believe it was linked to her arthritis medication.    • Diabetes Father    • Heart Disease Father    • Ubiquinol 100 MG Oral Cap, Take 1 capsule by mouth daily. , Disp: , Rfl: , 6/21/2020 at Unknown time  Multiple Vitamin (MULTI-VITAMIN DAILY) Oral Tab, Take 1 tablet by mouth daily.   , Disp: , Rfl: , 6/21/2020 at Unknown time  Cetirizine HCl (ZYRTEC ALLERGY) Neurologic: Awake alert oriented x3, no focal neurological deficits. Musculoskeletal: Full range of motion of all extremities. No swelling noted. Integument: No lesions. No erythema. Psychiatric: Appropriate mood and affect.       Diagnostic Data: KIDNEYS:  There is moderate right-sided hydronephrosis. There are 2 obstructing stones within the right proximal ureter which have not changed much in size or position since the study of 1/24/2020 measuring 4 and 5 mm.   There is a new stone within the   l 3. Urology consult  4. N.p.o. after midnight  2. Diabetes mellitus type 2  1. Hyperglycemia protocol  2. Follow Accu-Cheks  3. NovoLog sliding scale coverage as needed  3. Mild acute kidney injury due to obstructing kidney stone  1. IV fluids  2.  Follow BM Hayden Borja is a a(n) 42year old male with known history of nephrolithiasis, DM presented to the ED yesterday for left flank pain with radiation into the abdomen associated with nausea/vomiting.  CT scan was completed which revealed bilateral obstructi reports that he has never smoked.  He has never used smokeless tobacco. He reports that he does not use drugs.     Allergies:  No Known Allergies     Medications:     Current Facility-Administered Medications:   •  Insulin Aspart Pen (NOVOLOG) 100 UNIT/ML Abdomen: soft, NTND, no rebound/guarding  Extremities: extremities normal, atraumatic, no cyanosis or edema     Laboratory Data:        Lab Results   Component Value Date     WBC 14.5 06/23/2020     HGB 14.7 06/23/2020     HCT 45.2 06/23/2020     . 0 KIDNEYS: Rabia Chew is moderate right-sided hydronephrosis. Rabia Chew are 2 obstructing stones within the right proximal ureter which have not changed much in size or position since the study of 1/24/2020 measuring 4 and 5 mm.  There is a new stone within the   l FH: colon cancer     FH: CAD (coronary artery disease)     Type 2 diabetes mellitus ([facility]     Family history of malignant neoplasm of digestive organ     Uncontrolled type 2 diabetes mellitus with hyperglycemia ([facility]     Adjustment reaction with anxiety Electronically signed by Shanelle Modi MD at 6/23/2020  9:42 AM       ED to Hosp-Admission (Discharged) on 6/22/2020          Revision History          Detailed Report      Chart Review: Note Routing History     No routing history on file.    Robby Mann OPERATIVE TECHNIQUE:  Patient brought to the operative suite, administered general anesthetic as well as IV antibiotic. He was placed in lithotomy position, prepped and draped in sterile fashion.   A 22-Kuwaiti cystoscope was introduced into the patient's u stent was passed and it was seen coiling in right renal pelvis as well as urinary bladder. At the conclusion of procedure, patient's bladder was drained. It should be noted, he had some prostatic trauma as well.   He will be readmitted to the floor overni Assessment and Plan:   Bilateral ureteral stones  POD #1 Cystoscopy, right retrograde pyelogram, left ureteroscopic stone extraction with holmium laser lithotripsy, right ureteroscopy with holmium laser lithotripsy, bilateral ureteral stent placement.   AF,

## 2020-06-30 ENCOUNTER — OFFICE VISIT (OUTPATIENT)
Dept: FAMILY MEDICINE CLINIC | Facility: CLINIC | Age: 40
End: 2020-06-30
Payer: COMMERCIAL

## 2020-06-30 VITALS
TEMPERATURE: 99 F | WEIGHT: 269.5 LBS | SYSTOLIC BLOOD PRESSURE: 122 MMHG | BODY MASS INDEX: 35 KG/M2 | HEART RATE: 77 BPM | DIASTOLIC BLOOD PRESSURE: 78 MMHG | OXYGEN SATURATION: 98 %

## 2020-06-30 DIAGNOSIS — N20.0 BILATERAL KIDNEY STONES: ICD-10-CM

## 2020-06-30 DIAGNOSIS — E11.9 CONTROLLED TYPE 2 DIABETES MELLITUS WITHOUT COMPLICATION, WITHOUT LONG-TERM CURRENT USE OF INSULIN (HCC): Primary | ICD-10-CM

## 2020-06-30 DIAGNOSIS — N13.2 HYDRONEPHROSIS CONCURRENT WITH AND DUE TO CALCULI OF KIDNEY AND URETER: ICD-10-CM

## 2020-06-30 DIAGNOSIS — R50.9 FEVER AND CHILLS: ICD-10-CM

## 2020-06-30 DIAGNOSIS — N17.9 ACUTE KIDNEY INJURY (HCC): ICD-10-CM

## 2020-06-30 DIAGNOSIS — Z96.0 S/P URETERAL STENT PLACEMENT: ICD-10-CM

## 2020-06-30 DIAGNOSIS — E83.59 CALCIUM OXALATE CALCULUS: ICD-10-CM

## 2020-06-30 LAB
BILIRUBIN: NEGATIVE
CREAT UR-SCNC: 112 MG/DL
GLUCOSE (URINE DIPSTICK): 500 MG/DL
KETONES (URINE DIPSTICK): NEGATIVE MG/DL
MICROALBUMIN UR-MCNC: 32.4 MG/DL
MICROALBUMIN/CREAT 24H UR-RTO: 289.3 UG/MG (ref ?–30)
MULTISTIX EXPIRATION DATE: NORMAL DATE
MULTISTIX LOT#: NORMAL NUMERIC
NITRITE, URINE: POSITIVE
PH, URINE: 7 (ref 4.5–8)
PROTEIN (URINE DIPSTICK): 100 MG/DL
SPECIFIC GRAVITY: 1.02 (ref 1–1.03)
UROBILINOGEN,SEMI-QN: 0.2 MG/DL (ref 0–1.9)

## 2020-06-30 PROCEDURE — 82043 UR ALBUMIN QUANTITATIVE: CPT | Performed by: FAMILY MEDICINE

## 2020-06-30 PROCEDURE — 99496 TRANSJ CARE MGMT HIGH F2F 7D: CPT | Performed by: FAMILY MEDICINE

## 2020-06-30 PROCEDURE — 87086 URINE CULTURE/COLONY COUNT: CPT | Performed by: FAMILY MEDICINE

## 2020-06-30 PROCEDURE — 1111F DSCHRG MED/CURRENT MED MERGE: CPT | Performed by: FAMILY MEDICINE

## 2020-06-30 PROCEDURE — 82570 ASSAY OF URINE CREATININE: CPT | Performed by: FAMILY MEDICINE

## 2020-06-30 PROCEDURE — 81003 URINALYSIS AUTO W/O SCOPE: CPT | Performed by: FAMILY MEDICINE

## 2020-06-30 RX ORDER — CIPROFLOXACIN 250 MG/1
250 TABLET, FILM COATED ORAL 2 TIMES DAILY
Qty: 10 TABLET | Refills: 0 | Status: SHIPPED | OUTPATIENT
Start: 2020-06-30 | End: 2020-07-05

## 2020-07-01 DIAGNOSIS — R89.9 ABNORMAL LABORATORY TEST: ICD-10-CM

## 2020-07-01 DIAGNOSIS — E11.65 UNCONTROLLED TYPE 2 DIABETES MELLITUS WITH HYPERGLYCEMIA (HCC): Primary | ICD-10-CM

## 2020-07-01 PROBLEM — N17.9 ACUTE KIDNEY INJURY (HCC): Status: ACTIVE | Noted: 2020-07-01

## 2020-07-01 PROBLEM — Z96.0 S/P URETERAL STENT PLACEMENT: Status: ACTIVE | Noted: 2020-07-01

## 2020-07-01 PROBLEM — E11.9 CONTROLLED TYPE 2 DIABETES MELLITUS WITHOUT COMPLICATION, WITHOUT LONG-TERM CURRENT USE OF INSULIN (HCC): Status: ACTIVE | Noted: 2020-07-01

## 2020-07-01 PROBLEM — N13.2 HYDRONEPHROSIS CONCURRENT WITH AND DUE TO CALCULI OF KIDNEY AND URETER: Status: ACTIVE | Noted: 2020-07-01

## 2020-07-01 PROBLEM — E83.59 CALCIUM OXALATE CALCULUS: Status: ACTIVE | Noted: 2020-07-01

## 2020-07-01 RX ORDER — LOSARTAN POTASSIUM 50 MG/1
50 TABLET ORAL DAILY
Qty: 90 TABLET | Refills: 0 | Status: SHIPPED | OUTPATIENT
Start: 2020-07-01 | End: 2020-09-28

## 2020-07-01 NOTE — PROGRESS NOTES
Increase Losartan to 50 mg the urine microalbumin creatinine ratio is elevated. Repeat urine microalbumin creatinine ratio in 3 months.   Order future tests/medications sent to my chart

## 2020-07-01 NOTE — PROGRESS NOTES
HPI:    Teetee Gaona is a 42year old male here today for hospital follow up.    He was discharged from Inpatient hospital, [facility] to Home   Admission Date: 6/22/20   Discharge Date: 6/24/20  Hospital Discharge Diagnoses (since 6/1/2020)   Non lithotripsy. He finished the Keflex. Is still on oxybutynin and Phenazopyridine. Patient states for several days till yesterday he had a fever.   Yesterday states that his highest temperature was 102.9 his urine flow has improved and is not as painful 4.5 - 8.0    Protein Urine 100 Negative/Trace mg/dL    Urobilinogen Urine 0.2 0.0 - 1.9 mg/dL    Nitrite Urine Positive Negative    Leukocyte Esterase Urine Small Negative    APPEARANCE Cloudy Clear    Color Urine Red Yellow    Multistix Lot# [id number] Numer - 20.0 9.4 (L) 9.8 (L) 8.3 (L)   CALCIUM      8.5 - 10.1 mg/dL 8.2 (L) 8.8 8.6   CALCULATED OSMOLALITY      275 - 295 mOsm/kg 289 287 287   eGFR NON-AFR.  AMERICAN      >=60 64 68 68   eGFR       >=60 74 79 78   AST (SGOT)      15 - 37 U/L Oral Tab, Take 1 tablet by mouth every 6 (six) hours as needed. (Patient not taking: Reported on 6/25/2020 )    No current facility-administered medications on file prior to visit.          HISTORY: reconciled and reviewed with patient  He  has a past medic male patient. Estimated body mass index is 34.6 kg/m² as calculated from the following:    Height as of 6/22/20: 74\". Weight as of this encounter: 269 lb 8 oz (122.2 kg).    /78 (BP Location: Left arm, Patient Position: Sitting, Cuff Size: adult) W/O SCOPE  -     URINE CULTURE, ROUTINE; Future  Continue with the water intake follow-up with urology will need lithotripsy on the right kidney stones. He can call the urologist to see what date he will be scheduled for procedure.     Fever and chills  - with ciprofloxacin until surgery per notes from urology.     Bertis Litten, PA-C, 7/1/2020

## 2020-07-03 NOTE — PROGRESS NOTES
Please advise. Cx from initial hospitalization and now repeat are negative. Given fevers at presentation to PCP would you like him to continue abx until URS, start abx a few days prior, or hold on additional abx?

## 2020-07-06 NOTE — PROGRESS NOTES
Per Dr. Daniella Davila, MD Fish Anthony Doctor, [place]  Yes start abx 3 days prior     S/w patient. Script sent.

## 2020-07-13 ENCOUNTER — TELEPHONE (OUTPATIENT)
Dept: FAMILY MEDICINE CLINIC | Facility: CLINIC | Age: 40
End: 2020-07-13

## 2020-07-13 RX ORDER — CIPROFLOXACIN 250 MG/1
250 TABLET, FILM COATED ORAL 2 TIMES DAILY
COMMUNITY
End: 2021-03-05 | Stop reason: ALTCHOICE

## 2020-07-13 RX ORDER — SODIUM CHLORIDE, SODIUM LACTATE, POTASSIUM CHLORIDE, CALCIUM CHLORIDE 600; 310; 30; 20 MG/100ML; MG/100ML; MG/100ML; MG/100ML
INJECTION, SOLUTION INTRAVENOUS CONTINUOUS
Status: CANCELLED | OUTPATIENT
Start: 2020-07-13

## 2020-07-13 RX ORDER — BUSPIRONE HYDROCHLORIDE 10 MG/1
TABLET ORAL 2 TIMES DAILY PRN
Qty: 30 TABLET | Refills: 1 | Status: SHIPPED | OUTPATIENT
Start: 2020-07-13 | End: 2021-03-05

## 2020-07-13 RX ORDER — LORAZEPAM 0.5 MG/1
TABLET ORAL 2 TIMES DAILY
Qty: 5 TABLET | Refills: 0 | Status: SHIPPED | OUTPATIENT
Start: 2020-07-13 | End: 2021-03-05 | Stop reason: ALTCHOICE

## 2020-07-13 RX ORDER — ACETAMINOPHEN 500 MG
1000 TABLET ORAL ONCE
Status: CANCELLED | OUTPATIENT
Start: 2020-07-13 | End: 2020-07-13

## 2020-07-14 ENCOUNTER — APPOINTMENT (OUTPATIENT)
Dept: LAB | Age: 40
End: 2020-07-14
Attending: UROLOGY
Payer: COMMERCIAL

## 2020-07-14 DIAGNOSIS — Z01.818 PREOP TESTING: ICD-10-CM

## 2020-07-14 LAB
ATRIAL RATE: 68 BPM
P AXIS: 17 DEGREES
P-R INTERVAL: 156 MS
Q-T INTERVAL: 406 MS
QRS DURATION: 86 MS
QTC CALCULATION (BEZET): 431 MS
R AXIS: 53 DEGREES
T AXIS: 52 DEGREES
VENTRICULAR RATE: 68 BPM

## 2020-07-14 PROCEDURE — 93010 ELECTROCARDIOGRAM REPORT: CPT | Performed by: INTERNAL MEDICINE

## 2020-07-14 PROCEDURE — 93005 ELECTROCARDIOGRAM TRACING: CPT

## 2020-07-14 NOTE — TELEPHONE ENCOUNTER
Telephone call with patient he is having anxiety secondary to upcoming surgery is asking for antianxiety medicine. Would like something that he can take daily for the next week and a half then something stronger for the day of the surgery.   Will call in B

## 2020-07-18 ENCOUNTER — LAB ENCOUNTER (OUTPATIENT)
Dept: LAB | Facility: HOSPITAL | Age: 40
End: 2020-07-18
Attending: UROLOGY
Payer: COMMERCIAL

## 2020-07-18 DIAGNOSIS — Z01.818 PREOP TESTING: ICD-10-CM

## 2020-07-19 ENCOUNTER — ANESTHESIA EVENT (OUTPATIENT)
Dept: SURGERY | Facility: HOSPITAL | Age: 40
End: 2020-07-19
Payer: COMMERCIAL

## 2020-07-19 LAB — SARS-COV-2 RNA RESP QL NAA+PROBE: NOT DETECTED

## 2020-07-20 ENCOUNTER — HOSPITAL ENCOUNTER (OUTPATIENT)
Facility: HOSPITAL | Age: 40
Setting detail: HOSPITAL OUTPATIENT SURGERY
Discharge: HOME | End: 2020-07-20
Attending: UROLOGY | Admitting: UROLOGY
Payer: COMMERCIAL

## 2020-07-20 ENCOUNTER — ANESTHESIA (OUTPATIENT)
Dept: SURGERY | Facility: HOSPITAL | Age: 40
End: 2020-07-20
Payer: COMMERCIAL

## 2020-07-20 VITALS
DIASTOLIC BLOOD PRESSURE: 84 MMHG | BODY MASS INDEX: 32.71 KG/M2 | HEART RATE: 66 BPM | TEMPERATURE: 98 F | RESPIRATION RATE: 18 BRPM | OXYGEN SATURATION: 98 % | HEIGHT: 74 IN | SYSTOLIC BLOOD PRESSURE: 130 MMHG | WEIGHT: 254.88 LBS

## 2020-07-20 DIAGNOSIS — N13.2 URETERAL STONE WITH HYDRONEPHROSIS: ICD-10-CM

## 2020-07-20 DIAGNOSIS — Z01.818 PREOP TESTING: Primary | ICD-10-CM

## 2020-07-20 LAB
GLUCOSE BLD-MCNC: 179 MG/DL (ref 70–99)
GLUCOSE BLD-MCNC: 218 MG/DL (ref 70–99)

## 2020-07-20 PROCEDURE — 0TC68ZZ EXTIRPATION OF MATTER FROM RIGHT URETER, VIA NATURAL OR ARTIFICIAL OPENING ENDOSCOPIC: ICD-10-PCS | Performed by: UROLOGY

## 2020-07-20 PROCEDURE — 82962 GLUCOSE BLOOD TEST: CPT

## 2020-07-20 PROCEDURE — 82365 CALCULUS SPECTROSCOPY: CPT | Performed by: UROLOGY

## 2020-07-20 PROCEDURE — 0TP98DZ REMOVAL OF INTRALUMINAL DEVICE FROM URETER, VIA NATURAL OR ARTIFICIAL OPENING ENDOSCOPIC: ICD-10-PCS | Performed by: UROLOGY

## 2020-07-20 RX ORDER — NALOXONE HYDROCHLORIDE 0.4 MG/ML
80 INJECTION, SOLUTION INTRAMUSCULAR; INTRAVENOUS; SUBCUTANEOUS AS NEEDED
Status: DISCONTINUED | OUTPATIENT
Start: 2020-07-20 | End: 2020-07-20

## 2020-07-20 RX ORDER — HYDROCODONE BITARTRATE AND ACETAMINOPHEN 5; 325 MG/1; MG/1
2 TABLET ORAL AS NEEDED
Status: DISCONTINUED | OUTPATIENT
Start: 2020-07-20 | End: 2020-07-20

## 2020-07-20 RX ORDER — SODIUM CHLORIDE, SODIUM LACTATE, POTASSIUM CHLORIDE, CALCIUM CHLORIDE 600; 310; 30; 20 MG/100ML; MG/100ML; MG/100ML; MG/100ML
INJECTION, SOLUTION INTRAVENOUS CONTINUOUS
Status: DISCONTINUED | OUTPATIENT
Start: 2020-07-20 | End: 2020-07-20

## 2020-07-20 RX ORDER — ACETAMINOPHEN 500 MG
1000 TABLET ORAL ONCE
Status: DISCONTINUED | OUTPATIENT
Start: 2020-07-20 | End: 2020-07-20 | Stop reason: HOSPADM

## 2020-07-20 RX ORDER — INSULIN ASPART 100 [IU]/ML
INJECTION, SOLUTION INTRAVENOUS; SUBCUTANEOUS ONCE
Status: DISCONTINUED | OUTPATIENT
Start: 2020-07-20 | End: 2020-07-20

## 2020-07-20 RX ORDER — HYDROCODONE BITARTRATE AND ACETAMINOPHEN 5; 325 MG/1; MG/1
1 TABLET ORAL AS NEEDED
Status: DISCONTINUED | OUTPATIENT
Start: 2020-07-20 | End: 2020-07-20

## 2020-07-20 RX ORDER — MIDAZOLAM HYDROCHLORIDE 1 MG/ML
1 INJECTION INTRAMUSCULAR; INTRAVENOUS EVERY 5 MIN PRN
Status: DISCONTINUED | OUTPATIENT
Start: 2020-07-20 | End: 2020-07-20

## 2020-07-20 RX ORDER — HYDROMORPHONE HYDROCHLORIDE 1 MG/ML
0.4 INJECTION, SOLUTION INTRAMUSCULAR; INTRAVENOUS; SUBCUTANEOUS EVERY 5 MIN PRN
Status: DISCONTINUED | OUTPATIENT
Start: 2020-07-20 | End: 2020-07-20

## 2020-07-20 RX ORDER — ACETAMINOPHEN 500 MG
1000 TABLET ORAL EVERY 6 HOURS PRN
COMMUNITY
End: 2021-09-26 | Stop reason: ALTCHOICE

## 2020-07-20 RX ORDER — MEPERIDINE HYDROCHLORIDE 25 MG/ML
12.5 INJECTION INTRAMUSCULAR; INTRAVENOUS; SUBCUTANEOUS AS NEEDED
Status: COMPLETED | OUTPATIENT
Start: 2020-07-20 | End: 2020-07-20

## 2020-07-20 RX ORDER — DEXTROSE MONOHYDRATE 25 G/50ML
50 INJECTION, SOLUTION INTRAVENOUS
Status: DISCONTINUED | OUTPATIENT
Start: 2020-07-20 | End: 2020-07-20 | Stop reason: HOSPADM

## 2020-07-20 RX ORDER — LIDOCAINE HYDROCHLORIDE 10 MG/ML
INJECTION, SOLUTION EPIDURAL; INFILTRATION; INTRACAUDAL; PERINEURAL AS NEEDED
Status: DISCONTINUED | OUTPATIENT
Start: 2020-07-20 | End: 2020-07-20 | Stop reason: SURG

## 2020-07-20 RX ORDER — LABETALOL HYDROCHLORIDE 5 MG/ML
5 INJECTION, SOLUTION INTRAVENOUS EVERY 5 MIN PRN
Status: DISCONTINUED | OUTPATIENT
Start: 2020-07-20 | End: 2020-07-20

## 2020-07-20 RX ORDER — MEPERIDINE HYDROCHLORIDE 25 MG/ML
INJECTION INTRAMUSCULAR; INTRAVENOUS; SUBCUTANEOUS
Status: COMPLETED
Start: 2020-07-20 | End: 2020-07-20

## 2020-07-20 RX ORDER — ONDANSETRON 2 MG/ML
4 INJECTION INTRAMUSCULAR; INTRAVENOUS AS NEEDED
Status: DISCONTINUED | OUTPATIENT
Start: 2020-07-20 | End: 2020-07-20

## 2020-07-20 RX ORDER — DEXTROSE MONOHYDRATE 25 G/50ML
50 INJECTION, SOLUTION INTRAVENOUS
Status: DISCONTINUED | OUTPATIENT
Start: 2020-07-20 | End: 2020-07-20

## 2020-07-20 RX ADMIN — LIDOCAINE HYDROCHLORIDE 50 MG: 10 INJECTION, SOLUTION EPIDURAL; INFILTRATION; INTRACAUDAL; PERINEURAL at 09:19:00

## 2020-07-20 NOTE — ANESTHESIA PREPROCEDURE EVALUATION
PRE-OP EVALUATION    Patient Name: Jasmine Ax.    Pre-op Diagnosis: Ureteral stone with hydronephrosis [N13.2]    Procedure(s):  CYSTOSCOPY, BILATERAL RETROGRADE PYELOGRAM, BILATERAL URETEROSCOPY, LASER LITHOTRIPSY, STONE EXTRACTION, BILATERAL JOCELYNN Rosuvastatin Calcium 10 MG Oral Tab, Take 1 tablet (10 mg total) by mouth nightly., Disp: 90 tablet, Rfl: 2  Doxylamine Succinate, Sleep, (UNISOM OR), Take 25 mg by mouth as needed.  Take 1 tab as needed for sleep, Disp: , Rfl:   Multiple Vitamin (MULTI-V RDW 12.7 06/23/2020    .0 06/23/2020     Lab Results   Component Value Date     06/24/2020    K 4.1 06/24/2020     06/24/2020    CO2 29.0 06/24/2020    BUN 13 06/24/2020    CREATSERUM 1.39 (H) 06/24/2020     (H) 06/24/2020    CA 8

## 2020-07-20 NOTE — OPERATIVE REPORT
[facility]  Brief Op Note    [address]  Location: OR   [facility] [id number] MRN [id number]   Admission Date 7/20/2020 Operation Date 7/20/2020   Attending Physician Yoon Perez MD Operating Physician Orlando Orr MD     Pre-Operative

## 2020-07-20 NOTE — H&P
[address] [place] [address].  Patient Status:  Hospital Outpatient Surgery    1980 MRN [id number]   Location [facility] Attending Chandrika Salcedo MD   Hosp Day # 0 PCP Raford Cogan, DO His of about 1.0 standard drinks of alcohol per week. He reports that he does not use drugs. Allergies:  No Known Allergies    Home Medications:  No current outpatient medications on file. Review of Systems:  Pertinent items are noted in HPI.     Physic

## 2020-07-20 NOTE — OPERATIVE REPORT
[facility]    PATIENT'S NAME: Victor Apley    ATTENDING PHYSICIAN: Iona Mayer M.D. OPERATING PHYSICIAN: Iona Mayer M.D.    PATIENT ACCOUNT#:   [de-identified]    LOCATION:  [address] [place] [address] [address]  MEDICAL RECORD #:   [id number] stone fragments were noted. This was done atraumatically as well. At the conclusion of the procedure, a cystoscope was introduced into the patient's bladder. The patient's bladder was drained.  He awoke without difficulty and was taken to recovery room w

## 2020-07-20 NOTE — ANESTHESIA PROCEDURE NOTES
Airway  Urgency: elective      General Information and Staff    Patient location during procedure: OR  Anesthesiologist: Nathaniel Wagner MD  Resident/CRNA: Gustavo Kramer CRNA  Performed: CRNA     Indications and Patient Condition  Indications for airway manage

## 2020-07-20 NOTE — OR PREOP
Informed dr Sharda Sheridan blood sugar on admit 218. Pt took victoza last yesterday morning. Pt stated he ate a bunch of donuts last night because he would not have breakfast this morning. He stated at home it was 264 after the gatorade earlier this morning.

## 2020-07-20 NOTE — ANESTHESIA POSTPROCEDURE EVALUATION
Hugh.  Patient Status:  Hospital Outpatient Surgery   Age/Gender 42year old male MRN [id number]   [facility] SURGERY Attending Domingo Krishnan MD   Hosp Day # 0 PCP Latisha Mims DO       Anesthesia Po

## 2020-07-23 DIAGNOSIS — E78.2 MIXED HYPERLIPIDEMIA: ICD-10-CM

## 2020-07-23 RX ORDER — ROSUVASTATIN CALCIUM 10 MG/1
10 TABLET, COATED ORAL NIGHTLY
Qty: 90 TABLET | Refills: 2 | Status: SHIPPED | OUTPATIENT
Start: 2020-07-23 | End: 2020-11-16

## 2020-07-23 NOTE — TELEPHONE ENCOUNTER
Pt requesting refill of Rosuvastatin Calcium 10 MG Oral Tab     Passed protocol, refill approved, sent to pharmacy

## 2020-07-24 LAB
CALCULI MASS: 18 MG
CALCULI NUMBER: 1

## 2020-08-27 ENCOUNTER — PATIENT MESSAGE (OUTPATIENT)
Dept: FAMILY MEDICINE CLINIC | Facility: CLINIC | Age: 40
End: 2020-08-27

## 2020-08-27 DIAGNOSIS — Z20.822 SUSPECTED 2019 NOVEL CORONAVIRUS INFECTION: Primary | ICD-10-CM

## 2020-08-27 DIAGNOSIS — B34.9 ACUTE VIRAL SYNDROME: ICD-10-CM

## 2020-08-28 NOTE — TELEPHONE ENCOUNTER
From: Kierra Corrales. To: Mikaela Ellison PA-C  Sent: 8/27/2020 7:56 PM CDT  Subject: Non-Urgent Medical Question    I started not feeling well today.  My symptoms include fever (100.9, this evening), headache, fatigue, excessive yawning, a feeling l

## 2020-08-28 NOTE — TELEPHONE ENCOUNTER
Approve testing discuss CDC guidelines with him please or send him a my chart message with the information.

## 2020-08-31 ENCOUNTER — LAB ENCOUNTER (OUTPATIENT)
Dept: LAB | Age: 40
End: 2020-08-31
Attending: FAMILY MEDICINE
Payer: COMMERCIAL

## 2020-08-31 DIAGNOSIS — B34.9 ACUTE VIRAL SYNDROME: ICD-10-CM

## 2020-08-31 DIAGNOSIS — Z20.822 SUSPECTED 2019 NOVEL CORONAVIRUS INFECTION: ICD-10-CM

## 2020-09-02 LAB — SARS-COV-2 RNA RESP QL NAA+PROBE: NOT DETECTED

## 2020-09-06 ENCOUNTER — MOBILE ENCOUNTER (OUTPATIENT)
Dept: FAMILY MEDICINE CLINIC | Facility: CLINIC | Age: 40
End: 2020-09-06

## 2020-09-06 RX ORDER — AMOXICILLIN AND CLAVULANATE POTASSIUM 875; 125 MG/1; MG/1
1 TABLET, FILM COATED ORAL 2 TIMES DAILY
Qty: 20 TABLET | Refills: 0 | Status: SHIPPED | OUTPATIENT
Start: 2020-09-06 | End: 2020-09-06

## 2020-09-06 RX ORDER — AMOXICILLIN AND CLAVULANATE POTASSIUM 875; 125 MG/1; MG/1
1 TABLET, FILM COATED ORAL 2 TIMES DAILY
Qty: 20 TABLET | Refills: 0 | Status: SHIPPED | OUTPATIENT
Start: 2020-09-06 | End: 2020-09-16

## 2020-09-07 NOTE — PROGRESS NOTES
Patient called stating having symptoms of sharp abdominal pain off and on the suprapubic region he had very similar symptoms in January 2020 and was diagnosed with diverticulitis. He denies any fever or chills. Denies any nausea vomiting.  Denies any cold e

## 2020-09-27 DIAGNOSIS — E11.65 UNCONTROLLED TYPE 2 DIABETES MELLITUS WITH HYPERGLYCEMIA (HCC): ICD-10-CM

## 2020-09-28 RX ORDER — LOSARTAN POTASSIUM 50 MG/1
TABLET ORAL
Qty: 90 TABLET | Refills: 0 | Status: SHIPPED | OUTPATIENT
Start: 2020-09-28 | End: 2020-11-16

## 2020-10-20 ENCOUNTER — PATIENT MESSAGE (OUTPATIENT)
Dept: FAMILY MEDICINE CLINIC | Facility: CLINIC | Age: 40
End: 2020-10-20

## 2020-10-20 RX ORDER — LIRAGLUTIDE 6 MG/ML
1.8 INJECTION SUBCUTANEOUS DAILY
Qty: 3 PEN | Refills: 2 | Status: SHIPPED | OUTPATIENT
Start: 2020-10-20 | End: 2021-01-14

## 2020-10-20 NOTE — TELEPHONE ENCOUNTER
Victoza is listed as external so, Im not sure if you prescribe this for patient. I did pend medication and reminded him to complete labs. Please advise.

## 2020-10-20 NOTE — TELEPHONE ENCOUNTER
From: Charley Garcia. To: Mariajose Guevara PA-C  Sent: 10/20/2020 6:14 AM CDT  Subject: Prescription Question    I am unable to request a refill of victoza through [address] and would like to refill my prescription.

## 2020-11-16 DIAGNOSIS — E78.2 MIXED HYPERLIPIDEMIA: ICD-10-CM

## 2020-11-16 DIAGNOSIS — E11.65 UNCONTROLLED TYPE 2 DIABETES MELLITUS WITH HYPERGLYCEMIA (HCC): ICD-10-CM

## 2020-11-16 RX ORDER — ROSUVASTATIN CALCIUM 10 MG/1
10 TABLET, COATED ORAL NIGHTLY
Qty: 90 TABLET | Refills: 2 | Status: SHIPPED | OUTPATIENT
Start: 2020-11-16 | End: 2021-03-05

## 2020-11-16 RX ORDER — LOSARTAN POTASSIUM 50 MG/1
50 TABLET ORAL DAILY
Qty: 90 TABLET | Refills: 1 | Status: SHIPPED | OUTPATIENT
Start: 2020-11-16 | End: 2021-06-01

## 2020-11-28 ENCOUNTER — LAB ENCOUNTER (OUTPATIENT)
Dept: LAB | Age: 40
End: 2020-11-28
Attending: FAMILY MEDICINE
Payer: COMMERCIAL

## 2020-11-28 ENCOUNTER — TELEPHONE (OUTPATIENT)
Dept: FAMILY MEDICINE CLINIC | Facility: CLINIC | Age: 40
End: 2020-11-28

## 2020-11-28 DIAGNOSIS — E11.9 CONTROLLED TYPE 2 DIABETES MELLITUS WITHOUT COMPLICATION, WITHOUT LONG-TERM CURRENT USE OF INSULIN (HCC): ICD-10-CM

## 2020-11-28 DIAGNOSIS — Z00.00 LABORATORY EXAMINATION ORDERED AS PART OF A ROUTINE GENERAL MEDICAL EXAMINATION: ICD-10-CM

## 2020-11-28 DIAGNOSIS — E11.9 TYPE 2 DIABETES MELLITUS WITHOUT COMPLICATION, WITHOUT LONG-TERM CURRENT USE OF INSULIN (HCC): Primary | ICD-10-CM

## 2020-11-28 DIAGNOSIS — E78.2 MIXED HYPERLIPIDEMIA: ICD-10-CM

## 2020-11-28 DIAGNOSIS — E11.9 TYPE 2 DIABETES MELLITUS WITHOUT COMPLICATION, WITHOUT LONG-TERM CURRENT USE OF INSULIN (HCC): ICD-10-CM

## 2020-11-28 DIAGNOSIS — Z13.0 SCREENING FOR DEFICIENCY ANEMIA: ICD-10-CM

## 2020-11-28 DIAGNOSIS — E83.59 CALCIUM OXALATE CALCULUS: ICD-10-CM

## 2020-11-28 DIAGNOSIS — R89.9 ABNORMAL LABORATORY TEST: ICD-10-CM

## 2020-11-28 PROCEDURE — 80061 LIPID PANEL: CPT

## 2020-11-28 PROCEDURE — 80053 COMPREHEN METABOLIC PANEL: CPT

## 2020-11-28 PROCEDURE — 84443 ASSAY THYROID STIM HORMONE: CPT

## 2020-11-28 PROCEDURE — 83970 ASSAY OF PARATHORMONE: CPT

## 2020-11-28 PROCEDURE — 36415 COLL VENOUS BLD VENIPUNCTURE: CPT

## 2020-11-28 PROCEDURE — 83036 HEMOGLOBIN GLYCOSYLATED A1C: CPT

## 2020-11-28 PROCEDURE — 84439 ASSAY OF FREE THYROXINE: CPT

## 2020-11-28 PROCEDURE — 82570 ASSAY OF URINE CREATININE: CPT

## 2020-11-28 PROCEDURE — 82043 UR ALBUMIN QUANTITATIVE: CPT

## 2020-11-28 PROCEDURE — 85025 COMPLETE CBC W/AUTO DIFF WBC: CPT

## 2020-11-28 NOTE — TELEPHONE ENCOUNTER
Bimal Yun telephone call received the patient is due for blood work. Orders placed CBC, CMP, TSH, hemoglobin A1c. Urine microalbumin and lipids.

## 2020-11-29 DIAGNOSIS — E11.65 UNCONTROLLED TYPE 2 DIABETES MELLITUS WITH HYPERGLYCEMIA (HCC): ICD-10-CM

## 2020-11-30 DIAGNOSIS — E78.2 MIXED HYPERLIPIDEMIA: ICD-10-CM

## 2020-11-30 DIAGNOSIS — E11.65 UNCONTROLLED TYPE 2 DIABETES MELLITUS WITH HYPERGLYCEMIA (HCC): ICD-10-CM

## 2020-11-30 DIAGNOSIS — E11.9 TYPE 2 DIABETES MELLITUS WITHOUT COMPLICATION, WITHOUT LONG-TERM CURRENT USE OF INSULIN (HCC): Primary | ICD-10-CM

## 2020-11-30 RX ORDER — PEN NEEDLE, DIABETIC 30 GX3/16"
1 NEEDLE, DISPOSABLE MISCELLANEOUS DAILY
Qty: 100 EACH | Refills: 3 | Status: SHIPPED | OUTPATIENT
Start: 2020-11-30 | End: 2021-09-26

## 2020-11-30 NOTE — PROGRESS NOTES
Urine microalbumin is now normal.  Normal parathyroid hormone, thyroid testing and complete blood count. Kidney function is normal.  Increasing hemoglobin A1c follow-up to discuss diabetes management and needs complete physical at same time.   AST is eleva

## 2020-11-30 NOTE — TELEPHONE ENCOUNTER
Pt requesting refill of  Insulin Pen Needle (PEN NEEDLES) 32G X 4 MM Does not apply Misc     Passed protocol, refill approved, sent to pharmacy.

## 2020-12-01 ENCOUNTER — PATIENT MESSAGE (OUTPATIENT)
Dept: FAMILY MEDICINE CLINIC | Facility: CLINIC | Age: 40
End: 2020-12-01

## 2020-12-01 ENCOUNTER — MED REC SCAN ONLY (OUTPATIENT)
Dept: FAMILY MEDICINE CLINIC | Facility: CLINIC | Age: 40
End: 2020-12-01

## 2020-12-01 NOTE — TELEPHONE ENCOUNTER
From: Harmony Ravi. To: Doron Cleveland PA-C  Sent: 12/1/2020 12:03 PM CST  Subject: Other    Pervis Earing asked me to send over this health provider screening form for the office to submit. Please let me know if there are any questions, thanks!

## 2020-12-30 ENCOUNTER — TELEPHONE (OUTPATIENT)
Dept: FAMILY MEDICINE CLINIC | Facility: CLINIC | Age: 40
End: 2020-12-30

## 2020-12-30 DIAGNOSIS — Z01.84 IMMUNITY STATUS TESTING: Primary | ICD-10-CM

## 2021-01-07 ENCOUNTER — MED REC SCAN ONLY (OUTPATIENT)
Dept: FAMILY MEDICINE CLINIC | Facility: CLINIC | Age: 41
End: 2021-01-07

## 2021-01-14 RX ORDER — LIRAGLUTIDE 6 MG/ML
1.8 INJECTION SUBCUTANEOUS DAILY
Qty: 3 PEN | Refills: 0 | Status: SHIPPED | OUTPATIENT
Start: 2021-01-14 | End: 2021-02-08

## 2021-01-14 NOTE — TELEPHONE ENCOUNTER
Requested Prescriptions     Pending Prescriptions Disp Refills   • VICTOZA 18 MG/3ML Subcutaneous Solution Pen-injector [Pharmacy Med Name: Paul Brooks 3-BRAYAN 18 MG/3 ML PEN]  0     Sig: INJECT 1.8 MG UNDER THE SKIN ONCE DAILY     Last fill was 10/20/20 3 pens

## 2021-02-08 RX ORDER — LIRAGLUTIDE 6 MG/ML
1.8 INJECTION SUBCUTANEOUS DAILY
Qty: 3 PEN | Refills: 0 | Status: SHIPPED | OUTPATIENT
Start: 2021-02-08 | End: 2021-03-11

## 2021-02-08 NOTE — TELEPHONE ENCOUNTER
VICTOZA 3-BRAYAN 18 MG/3 ML PEN 10/20/2020 10/20/2020  27 mL  90     LOV 6/30/20  Patient due for 3 month labs.  Last done 11/28/20    No future OV   Illumagear message sent IV discontinued, cath removed intact

## 2021-02-09 DIAGNOSIS — E11.65 UNCONTROLLED TYPE 2 DIABETES MELLITUS WITH HYPERGLYCEMIA (HCC): ICD-10-CM

## 2021-02-09 RX ORDER — BLOOD SUGAR DIAGNOSTIC
STRIP MISCELLANEOUS
Qty: 100 STRIP | Refills: 11 | Status: SHIPPED | OUTPATIENT
Start: 2021-02-09

## 2021-03-05 ENCOUNTER — OFFICE VISIT (OUTPATIENT)
Dept: FAMILY MEDICINE CLINIC | Facility: CLINIC | Age: 41
End: 2021-03-05
Payer: COMMERCIAL

## 2021-03-05 VITALS
TEMPERATURE: 97 F | BODY MASS INDEX: 35.04 KG/M2 | HEART RATE: 76 BPM | HEIGHT: 74 IN | OXYGEN SATURATION: 98 % | SYSTOLIC BLOOD PRESSURE: 110 MMHG | DIASTOLIC BLOOD PRESSURE: 70 MMHG | WEIGHT: 273 LBS

## 2021-03-05 DIAGNOSIS — E66.9 OBESITY (BMI 35.0-39.9 WITHOUT COMORBIDITY): ICD-10-CM

## 2021-03-05 DIAGNOSIS — E11.65 UNCONTROLLED TYPE 2 DIABETES MELLITUS WITH HYPERGLYCEMIA (HCC): Primary | ICD-10-CM

## 2021-03-05 DIAGNOSIS — E78.2 MIXED HYPERLIPIDEMIA: ICD-10-CM

## 2021-03-05 DIAGNOSIS — B35.1 ONYCHOMYCOSIS OF RIGHT GREAT TOE: ICD-10-CM

## 2021-03-05 PROBLEM — N20.0 KIDNEY STONES: Status: RESOLVED | Noted: 2020-06-24 | Resolved: 2021-03-05

## 2021-03-05 PROBLEM — N17.9 ACUTE KIDNEY INJURY (HCC): Status: RESOLVED | Noted: 2020-07-01 | Resolved: 2021-03-05

## 2021-03-05 PROBLEM — N13.2 HYDRONEPHROSIS CONCURRENT WITH AND DUE TO CALCULI OF KIDNEY AND URETER: Status: RESOLVED | Noted: 2020-07-01 | Resolved: 2021-03-05

## 2021-03-05 PROBLEM — E11.9 TYPE 2 DIABETES MELLITUS (HCC): Status: RESOLVED | Noted: 2018-09-12 | Resolved: 2021-03-05

## 2021-03-05 PROBLEM — Z96.0 S/P URETERAL STENT PLACEMENT: Status: RESOLVED | Noted: 2020-07-01 | Resolved: 2021-03-05

## 2021-03-05 PROBLEM — N39.0 UTI (URINARY TRACT INFECTION): Status: RESOLVED | Noted: 2020-06-23 | Resolved: 2021-03-05

## 2021-03-05 PROBLEM — E11.9 CONTROLLED TYPE 2 DIABETES MELLITUS WITHOUT COMPLICATION, WITHOUT LONG-TERM CURRENT USE OF INSULIN (HCC): Status: RESOLVED | Noted: 2020-07-01 | Resolved: 2021-03-05

## 2021-03-05 PROBLEM — N20.0 KIDNEY STONE: Status: RESOLVED | Noted: 2020-06-22 | Resolved: 2021-03-05

## 2021-03-05 LAB
CARTRIDGE LOT#: 726 NUMERIC
HEMOGLOBIN A1C: 7.7 % (ref 4.3–5.6)

## 2021-03-05 PROCEDURE — 99214 OFFICE O/P EST MOD 30 MIN: CPT | Performed by: FAMILY MEDICINE

## 2021-03-05 PROCEDURE — 3078F DIAST BP <80 MM HG: CPT | Performed by: FAMILY MEDICINE

## 2021-03-05 PROCEDURE — 3074F SYST BP LT 130 MM HG: CPT | Performed by: FAMILY MEDICINE

## 2021-03-05 PROCEDURE — 83036 HEMOGLOBIN GLYCOSYLATED A1C: CPT | Performed by: FAMILY MEDICINE

## 2021-03-05 PROCEDURE — 3008F BODY MASS INDEX DOCD: CPT | Performed by: FAMILY MEDICINE

## 2021-03-05 RX ORDER — ROSUVASTATIN CALCIUM 10 MG/1
10 TABLET, COATED ORAL NIGHTLY
Qty: 90 TABLET | Refills: 2 | Status: SHIPPED | OUTPATIENT
Start: 2021-03-05 | End: 2021-12-22

## 2021-03-05 RX ORDER — EMPAGLIFLOZIN 10 MG/1
10 TABLET, FILM COATED ORAL EVERY MORNING
Qty: 90 TABLET | Refills: 0 | Status: SHIPPED | OUTPATIENT
Start: 2021-03-05 | End: 2021-06-01

## 2021-03-05 NOTE — PROGRESS NOTES
HPI:   Chick Bilberry. is a 47year old male who presents for recheck of his diabetes and hyperlipidemia.     Patient has continued toenail fungus on the right toenail no treatment was used  Patient is started exercising has an exercise bike and a rowin 01/09/2015      FLULAVAL 6 months & older 0.5 ml Prefilled syringe (36460)                          10/05/2017  09/11/2018  10/18/2019      FLUZONE 6 months and older PFS 0.5 ml (77724)                          10/05/2017  09/11/2018      Fluarix 6 Months daily.     • Multiple Vitamin (MULTI-VITAMIN DAILY) Oral Tab Take 1 tablet by mouth daily. • Cetirizine HCl (ZYRTEC ALLERGY) 10 MG Oral Tab Take 10 mg by mouth daily.      • acetaminophen 500 MG Oral Tab Take 1,000 mg by mouth every 6 (six) hours as n tingling  Endo: denies polyuria or blurred vision  EXAM:   /70   Pulse 76   Temp 97.1 °F (36.2 °C) (Other)   Ht 6' 2\" (1.88 m)   Wt 273 lb (123.8 kg)   SpO2 98%   BMI 35.05 kg/m²   GENERAL: well developed, well nourished,in no apparent distress, ple months. Advised to lose weight if needed with carbohydrate controlled diet and exercise, refer to Ophthalmology yearly exam required, check feet daily.   Check feet daily  Eye exam yearly  Check blood sugar in the morning and 2 hours after eating  Follow-u

## 2021-03-11 DIAGNOSIS — E11.65 UNCONTROLLED TYPE 2 DIABETES MELLITUS WITH HYPERGLYCEMIA (HCC): Primary | ICD-10-CM

## 2021-03-11 RX ORDER — LIRAGLUTIDE 6 MG/ML
1.8 INJECTION SUBCUTANEOUS DAILY
Qty: 27 ML | Refills: 1 | Status: SHIPPED | OUTPATIENT
Start: 2021-03-11 | End: 2021-06-04

## 2021-03-11 RX ORDER — LIRAGLUTIDE 6 MG/ML
INJECTION SUBCUTANEOUS
Qty: 3 PEN | Refills: 1 | Status: SHIPPED | OUTPATIENT
Start: 2021-03-11 | End: 2021-03-11

## 2021-03-11 NOTE — TELEPHONE ENCOUNTER
Requested Prescriptions     Signed Prescriptions Disp Refills   • VICTOZA 18 MG/3ML Subcutaneous Solution Pen-injector 3 pen 1     Sig: INJECT 1.8 MG UNDER THE SKIN ONCE DAILY     Authorizing Provider: Henry Bean     Ordering User: Luis Lara

## 2021-05-29 DIAGNOSIS — E11.65 UNCONTROLLED TYPE 2 DIABETES MELLITUS WITH HYPERGLYCEMIA (HCC): ICD-10-CM

## 2021-06-01 DIAGNOSIS — E11.65 UNCONTROLLED TYPE 2 DIABETES MELLITUS WITH HYPERGLYCEMIA (HCC): ICD-10-CM

## 2021-06-01 RX ORDER — LOSARTAN POTASSIUM 50 MG/1
TABLET ORAL
Qty: 90 TABLET | Refills: 0 | Status: SHIPPED | OUTPATIENT
Start: 2021-06-01 | End: 2021-12-22

## 2021-06-01 RX ORDER — EMPAGLIFLOZIN 10 MG/1
TABLET, FILM COATED ORAL
Qty: 90 TABLET | Refills: 0 | Status: SHIPPED | OUTPATIENT
Start: 2021-06-01 | End: 2021-08-27

## 2021-06-01 NOTE — TELEPHONE ENCOUNTER
Pt requesting refill of losartan    Passed protocol, refill approved, sent to pharmacy. Last Office Visit with Provider: 3/5/21  The patient is asked to return in 3 months  No future appointments.

## 2021-06-01 NOTE — TELEPHONE ENCOUNTER
Last Office Visit with Provider: 3/5/21  The patient is asked to return in 3 months  No future appointments.     Last refill approved and Cymbet message sent

## 2021-08-06 ENCOUNTER — PATIENT MESSAGE (OUTPATIENT)
Dept: FAMILY MEDICINE CLINIC | Facility: CLINIC | Age: 41
End: 2021-08-06

## 2021-08-06 RX ORDER — SEMAGLUTIDE 1.34 MG/ML
0.5 INJECTION, SOLUTION SUBCUTANEOUS
Qty: 3 EACH | Refills: 0 | Status: SHIPPED | OUTPATIENT
Start: 2021-08-06 | End: 2021-10-26

## 2021-08-06 RX ORDER — SEMAGLUTIDE 1.34 MG/ML
0.25 INJECTION, SOLUTION SUBCUTANEOUS
Qty: 2 EACH | Refills: 0 | Status: CANCELLED | OUTPATIENT
Start: 2021-08-06 | End: 2021-09-03

## 2021-08-06 NOTE — TELEPHONE ENCOUNTER
Last fill was 6/4/21 2 pens 0 refill  Last OV 3/5/21  The patient is asked to return in 3 months    No future OV

## 2021-08-06 NOTE — TELEPHONE ENCOUNTER
From: Jumana Campbell. To: Rosenda Low PA-C  Sent: 8/6/2021 6:10 AM CDT  Subject: Prescription Question    I would like to refill my Ozempic, and my insurer sent a letter demanding I switch to a 90-day supply.

## 2021-08-27 DIAGNOSIS — E11.65 UNCONTROLLED TYPE 2 DIABETES MELLITUS WITH HYPERGLYCEMIA (HCC): ICD-10-CM

## 2021-08-27 RX ORDER — EMPAGLIFLOZIN 10 MG/1
TABLET, FILM COATED ORAL
Qty: 90 TABLET | Refills: 0 | Status: SHIPPED | OUTPATIENT
Start: 2021-08-27

## 2021-08-27 NOTE — TELEPHONE ENCOUNTER
Requested Prescriptions     Pending Prescriptions Disp Refills   • JARDIANCE 10 MG Oral Tab [Pharmacy Med Name: Rafael Rees 10 MG TABLET] 90 tablet 0     Sig: TAKE 1 TABLET BY MOUTH EVERY MORNING     Last fill was 6/1/21 90 tabs  Last OV 3/5/21  The patient

## 2021-09-26 ENCOUNTER — HOSPITAL ENCOUNTER (OUTPATIENT)
Age: 41
Discharge: HOME | End: 2021-09-26
Payer: COMMERCIAL

## 2021-09-26 VITALS
WEIGHT: 260 LBS | OXYGEN SATURATION: 95 % | SYSTOLIC BLOOD PRESSURE: 138 MMHG | DIASTOLIC BLOOD PRESSURE: 87 MMHG | HEART RATE: 62 BPM | RESPIRATION RATE: 16 BRPM | TEMPERATURE: 99 F | BODY MASS INDEX: 33 KG/M2

## 2021-09-26 DIAGNOSIS — R21 RASH: Primary | ICD-10-CM

## 2021-09-26 PROCEDURE — 99213 OFFICE O/P EST LOW 20 MIN: CPT | Performed by: PHYSICIAN ASSISTANT

## 2021-09-26 NOTE — ED INITIAL ASSESSMENT (HPI)
Pt presents with insect bite to l forearm which started itching yesterday, bite has faint red ring around it, wife concerned it is Lime Dx

## 2021-09-26 NOTE — ED PROVIDER NOTES
Patient Seen in: [facility]      History   Patient presents with:  Bite Sting,Insect    Stated Complaint: L. Arm- Possible Bite, Irritation with red Sun'aq    Subjective:   HPI    28-year-old male presents to immediate care for rash on his left Positive for rash. Allergic/Immunologic: Negative for food allergies. Neurological: Negative for headaches. Psychiatric/Behavioral: Negative for suicidal ideas. All other systems reviewed and are negative. Positive for stated complaint: SHI Gil- states his wife was concerned that it might be Lyme disease. Patient has no other symptoms of Lyme disease like fevers, chills, body aches. Patient did not ever notice a tick. No recent trip to nature. The bite looks more like a mosquito bite to me.   Jarad Crews

## 2021-09-27 ENCOUNTER — LAB ENCOUNTER (OUTPATIENT)
Dept: LAB | Age: 41
End: 2021-09-27
Attending: FAMILY MEDICINE
Payer: COMMERCIAL

## 2021-09-27 ENCOUNTER — TELEPHONE (OUTPATIENT)
Dept: FAMILY MEDICINE CLINIC | Facility: CLINIC | Age: 41
End: 2021-09-27

## 2021-09-27 DIAGNOSIS — S50.862A INSECT BITE OF LEFT FOREARM, INITIAL ENCOUNTER: ICD-10-CM
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
## 2021-09-27 DIAGNOSIS — E78.2 MIXED HYPERLIPIDEMIA: ICD-10-CM

## 2021-09-27 DIAGNOSIS — E11.65 UNCONTROLLED TYPE 2 DIABETES MELLITUS WITH HYPERGLYCEMIA (HCC): ICD-10-CM

## 2021-09-27 DIAGNOSIS — R21 RASH AND NONSPECIFIC SKIN ERUPTION: ICD-10-CM

## 2021-09-27 DIAGNOSIS — R21 RASH AND NONSPECIFIC SKIN ERUPTION: Primary | ICD-10-CM

## 2021-09-27 PROCEDURE — 82570 ASSAY OF URINE CREATININE: CPT

## 2021-09-27 PROCEDURE — 3061F NEG MICROALBUMINURIA REV: CPT | Performed by: FAMILY MEDICINE

## 2021-09-27 PROCEDURE — 3051F HG A1C>EQUAL 7.0%<8.0%: CPT | Performed by: FAMILY MEDICINE

## 2021-09-27 PROCEDURE — 83036 HEMOGLOBIN GLYCOSYLATED A1C: CPT

## 2021-09-27 PROCEDURE — 80053 COMPREHEN METABOLIC PANEL: CPT

## 2021-09-27 PROCEDURE — 82043 UR ALBUMIN QUANTITATIVE: CPT

## 2021-09-27 PROCEDURE — 87476 LYME DIS DNA AMP PROBE: CPT

## 2021-09-27 PROCEDURE — 82248 BILIRUBIN DIRECT: CPT

## 2021-09-27 PROCEDURE — 36415 COLL VENOUS BLD VENIPUNCTURE: CPT

## 2021-09-27 PROCEDURE — 80061 LIPID PANEL: CPT

## 2021-09-27 RX ORDER — DOXYCYCLINE HYCLATE 100 MG/1
100 CAPSULE ORAL 2 TIMES DAILY
Qty: 20 CAPSULE | Refills: 0 | Status: SHIPPED | OUTPATIENT
Start: 2021-09-27 | End: 2021-10-07

## 2021-09-27 NOTE — TELEPHONE ENCOUNTER
Patient has a 3 cm bulls eye rash macular rash with center mild elevation. Concern over Lymes unknown tick exposure. Would like prophylactic antibiotic. Doxy 100 mg bid for 10 days.

## 2021-09-28 NOTE — PROGRESS NOTES
Follow-up in the office to discuss diabetes hemoglobin A1c 7.0 goal less than 7 ideally 6.5. Urine microalbumin is normal.  Lipid panel Elevated triglycerides Omega 3 take a total of 2-4 grams (2,000-4,000 mg) of EPA plus DHA.   I can prescribe Lovaza not

## 2021-10-26 RX ORDER — SEMAGLUTIDE 1.34 MG/ML
0.5 INJECTION, SOLUTION SUBCUTANEOUS
Qty: 3 EACH | Refills: 1 | Status: SHIPPED | OUTPATIENT
Start: 2021-10-26 | End: 2021-11-23

## 2021-10-26 NOTE — TELEPHONE ENCOUNTER
Semaglutide,0.25 or 0.5MG/DOS, (OZEMPIC, 0.25 OR 0.5 MG/DOSE,) 2 MG/1.5ML Subcutaneous Solution Pen-injector () 3 each 0 2021 9/3/2021   Sig:   Inject 0.5 mg into the skin every 7 days for 28 days.        LOV 3/5/21    No FOV

## 2021-12-01 DIAGNOSIS — E11.65 UNCONTROLLED TYPE 2 DIABETES MELLITUS WITH HYPERGLYCEMIA (HCC): ICD-10-CM

## 2021-12-01 RX ORDER — EMPAGLIFLOZIN 10 MG/1
TABLET, FILM COATED ORAL
Qty: 90 TABLET | Refills: 0 | OUTPATIENT
Start: 2021-12-01

## 2021-12-01 NOTE — TELEPHONE ENCOUNTER
Requested Prescriptions     Refused Prescriptions Disp Refills   • JARDIANCE 10 MG Oral Tab [Pharmacy Med Name: Celi Eilzabeth 10 MG TABLET] 90 tablet 0     Sig: TAKE 1 TABLET BY MOUTH EVERY MORNING     Refused By: Anthony Kiser     Reason for Refusal: Appt req

## 2021-12-22 ENCOUNTER — OFFICE VISIT (OUTPATIENT)
Dept: FAMILY MEDICINE CLINIC | Facility: CLINIC | Age: 41
End: 2021-12-22
Payer: COMMERCIAL

## 2021-12-22 VITALS
DIASTOLIC BLOOD PRESSURE: 70 MMHG | RESPIRATION RATE: 16 BRPM | SYSTOLIC BLOOD PRESSURE: 98 MMHG | TEMPERATURE: 97 F | WEIGHT: 263 LBS | HEART RATE: 84 BPM | OXYGEN SATURATION: 99 % | HEIGHT: 74 IN | BODY MASS INDEX: 33.75 KG/M2

## 2021-12-22 DIAGNOSIS — Z99.89 OSA ON CPAP: ICD-10-CM

## 2021-12-22 DIAGNOSIS — Z00.00 WELL ADULT EXAM: ICD-10-CM

## 2021-12-22 DIAGNOSIS — E78.2 MIXED HYPERLIPIDEMIA: ICD-10-CM

## 2021-12-22 DIAGNOSIS — G47.33 OSA ON CPAP: ICD-10-CM

## 2021-12-22 DIAGNOSIS — E11.65 UNCONTROLLED TYPE 2 DIABETES MELLITUS WITH HYPERGLYCEMIA (HCC): Primary | ICD-10-CM

## 2021-12-22 DIAGNOSIS — Z79.899 MEDICATION MANAGEMENT: ICD-10-CM

## 2021-12-22 DIAGNOSIS — I10 PRIMARY HYPERTENSION: ICD-10-CM

## 2021-12-22 DIAGNOSIS — E66.9 OBESITY (BMI 30.0-34.9): ICD-10-CM

## 2021-12-22 DIAGNOSIS — Z00.00 LABORATORY EXAMINATION ORDERED AS PART OF A ROUTINE GENERAL MEDICAL EXAMINATION: ICD-10-CM

## 2021-12-22 PROCEDURE — 99214 OFFICE O/P EST MOD 30 MIN: CPT | Performed by: FAMILY MEDICINE

## 2021-12-22 PROCEDURE — 90471 IMMUNIZATION ADMIN: CPT | Performed by: FAMILY MEDICINE

## 2021-12-22 PROCEDURE — 3074F SYST BP LT 130 MM HG: CPT | Performed by: FAMILY MEDICINE

## 2021-12-22 PROCEDURE — 3078F DIAST BP <80 MM HG: CPT | Performed by: FAMILY MEDICINE

## 2021-12-22 PROCEDURE — 90686 IIV4 VACC NO PRSV 0.5 ML IM: CPT | Performed by: FAMILY MEDICINE

## 2021-12-22 PROCEDURE — 99395 PREV VISIT EST AGE 18-39: CPT | Performed by: FAMILY MEDICINE

## 2021-12-22 PROCEDURE — 3008F BODY MASS INDEX DOCD: CPT | Performed by: FAMILY MEDICINE

## 2021-12-22 RX ORDER — LOSARTAN POTASSIUM 50 MG/1
50 TABLET ORAL DAILY
Qty: 90 TABLET | Refills: 1 | Status: SHIPPED | OUTPATIENT
Start: 2021-12-22

## 2021-12-22 RX ORDER — ROSUVASTATIN CALCIUM 10 MG/1
10 TABLET, COATED ORAL NIGHTLY
Qty: 90 TABLET | Refills: 3 | Status: SHIPPED | OUTPATIENT
Start: 2021-12-22

## 2021-12-22 NOTE — PROGRESS NOTES
Kathleen Neves. is a 46year old male who presents for a complete physical exam.   HPI:   CPE, follow up DM and recent labs needs refill on medications for hypertension, hyperlipidemia.   Well adult exam  COVID vaccine   Getting booster this week  Denta Non HDL Chol 143 (H) <130 mg/dL    FASTING Yes    HEMOGLOBIN A1C   Result Value Ref Range    HgbA1C 7.0 (H) <5.7 %    Estimated Average Glucose 154 (H) 68 - 126 mg/dL   COMP METABOLIC PANEL (14)   Result Value Ref Range    Glucose 215 (H) 70 - 99 mg/dL linked to her arthritis medication.    • Diabetes Father    • Heart Disease Father    • Heart Attack Father    • Dementia Paternal Grandmother    • Macular degeneration Paternal Grandmother    • Diabetes Paternal Grandfather    • No Known Problems Other Result Value Ref Range    Borrelia Species Source Blood     Borrelia Species DNA Detection Not Detected    BILIRUBIN, DIRECT   Result Value Ref Range    Bilirubin, Direct 0.2 0.0 - 0.2 mg/dL       Current Outpatient Medications   Medication Sig Dispense Grandmother    • Diabetes Paternal Grandfather    • No Known Problems Other    • Breast Cancer Maternal Aunt       Social History:  Social History    Tobacco Use      Smoking status: Never Smoker      Smokeless tobacco: Never Used    Vaping Use      Vaping turbinatess normal b/l, oropharynx with mmm/clear/normal, gingiva normal, lips normal  EYES: PERRLA, EOMI, conjunctiva non-injected and non-icteric  NECK: supple, no adenopathy/thyromegaly/thyroid nodules/masses  CHEST: no chest tenderness  BREAST: no susp A, Adult          08/17/2018  10/18/2019      Influenza             09/11/2018      Pneumovax 23          12/21/2018      TDAP                  11/16/2012      ASSESSMENT AND PLAN:   Kierra Corrales. is a 46year old male who presents for a complete phy (50 mg total) by mouth daily. Dispense: 90 tablet; Refill: 1  - rosuvastatin 10 MG Oral Tab; Take 1 tablet (10 mg total) by mouth nightly. Dispense: 90 tablet; Refill: 3  - Semaglutide, 1 MG/DOSE, 2 MG/1.5ML Subcutaneous Solution Pen-injector;  Inject 1 m

## 2021-12-23 PROBLEM — E66.9 OBESITY (BMI 30.0-34.9): Status: ACTIVE | Noted: 2018-08-18

## 2021-12-24 ENCOUNTER — IMMUNIZATION (OUTPATIENT)
Dept: LAB | Facility: HOSPITAL | Age: 41
End: 2021-12-24
Attending: EMERGENCY MEDICINE
Payer: COMMERCIAL

## 2021-12-24 DIAGNOSIS — Z23 NEED FOR VACCINATION: Primary | ICD-10-CM

## 2021-12-24 PROCEDURE — 0064A SARSCOV2 VAC 50MCG/0.25ML IM: CPT

## 2022-04-26 ENCOUNTER — TELEPHONE (OUTPATIENT)
Dept: FAMILY MEDICINE CLINIC | Facility: CLINIC | Age: 42
End: 2022-04-26

## 2022-04-26 NOTE — TELEPHONE ENCOUNTER
Oksana Salvador from [facility] called asking to clarify a script that was sent to him. He can be reached at [phone].

## 2022-05-28 DIAGNOSIS — E11.65 UNCONTROLLED TYPE 2 DIABETES MELLITUS WITH HYPERGLYCEMIA (HCC): ICD-10-CM

## 2022-05-31 RX ORDER — EMPAGLIFLOZIN 10 MG/1
TABLET, FILM COATED ORAL
Qty: 30 TABLET | Refills: 0 | Status: SHIPPED | OUTPATIENT
Start: 2022-05-31

## 2022-06-26 DIAGNOSIS — E11.65 UNCONTROLLED TYPE 2 DIABETES MELLITUS WITH HYPERGLYCEMIA (HCC): ICD-10-CM

## 2022-06-27 ENCOUNTER — OFFICE VISIT (OUTPATIENT)
Dept: FAMILY MEDICINE CLINIC | Facility: CLINIC | Age: 42
End: 2022-06-27
Payer: COMMERCIAL

## 2022-06-27 VITALS
RESPIRATION RATE: 18 BRPM | OXYGEN SATURATION: 99 % | DIASTOLIC BLOOD PRESSURE: 78 MMHG | BODY MASS INDEX: 31.95 KG/M2 | TEMPERATURE: 97 F | HEART RATE: 84 BPM | WEIGHT: 249 LBS | SYSTOLIC BLOOD PRESSURE: 108 MMHG | HEIGHT: 74 IN

## 2022-06-27 DIAGNOSIS — R80.9 CONTROLLED TYPE 2 DIABETES MELLITUS WITH MICROALBUMINURIA, WITHOUT LONG-TERM CURRENT USE OF INSULIN (HCC): Primary | ICD-10-CM

## 2022-06-27 DIAGNOSIS — R53.83 FATIGUE, UNSPECIFIED TYPE: ICD-10-CM

## 2022-06-27 DIAGNOSIS — R00.2 PALPITATIONS: ICD-10-CM

## 2022-06-27 DIAGNOSIS — E11.29 CONTROLLED TYPE 2 DIABETES MELLITUS WITH MICROALBUMINURIA, WITHOUT LONG-TERM CURRENT USE OF INSULIN (HCC): Primary | ICD-10-CM

## 2022-06-27 DIAGNOSIS — R42 EPISODIC LIGHTHEADEDNESS: ICD-10-CM

## 2022-06-27 DIAGNOSIS — Z82.49 FH: CAD (CORONARY ARTERY DISEASE): ICD-10-CM

## 2022-06-27 DIAGNOSIS — E78.2 MIXED HYPERLIPIDEMIA: ICD-10-CM

## 2022-06-27 LAB
ALBUMIN SERPL-MCNC: 4.4 G/DL (ref 3.4–5)
ALBUMIN/GLOB SERPL: 1.2 {RATIO} (ref 1–2)
ALP LIVER SERPL-CCNC: 85 U/L
ALT SERPL-CCNC: 24 U/L
ANION GAP SERPL CALC-SCNC: 6 MMOL/L (ref 0–18)
AST SERPL-CCNC: 27 U/L (ref 15–37)
BASOPHILS # BLD AUTO: 0.04 X10(3) UL (ref 0–0.2)
BASOPHILS NFR BLD AUTO: 0.4 %
BILIRUB SERPL-MCNC: 0.9 MG/DL (ref 0.1–2)
BUN BLD-MCNC: 9 MG/DL (ref 7–18)
CALCIUM BLD-MCNC: 9.7 MG/DL (ref 8.5–10.1)
CARTRIDGE LOT#: 970 NUMERIC
CHLORIDE SERPL-SCNC: 103 MMOL/L (ref 98–112)
CHOLEST SERPL-MCNC: 194 MG/DL (ref ?–200)
CO2 SERPL-SCNC: 28 MMOL/L (ref 21–32)
CREAT BLD-MCNC: 0.86 MG/DL
CREAT UR-SCNC: 163 MG/DL
EOSINOPHIL # BLD AUTO: 0.09 X10(3) UL (ref 0–0.7)
EOSINOPHIL NFR BLD AUTO: 0.9 %
ERYTHROCYTE [DISTWIDTH] IN BLOOD BY AUTOMATED COUNT: 12.2 %
EST. AVERAGE GLUCOSE BLD GHB EST-MCNC: 134 MG/DL (ref 68–126)
FASTING PATIENT LIPID ANSWER: YES
FASTING STATUS PATIENT QL REPORTED: YES
GLOBULIN PLAS-MCNC: 3.8 G/DL (ref 2.8–4.4)
GLUCOSE BLD-MCNC: 112 MG/DL (ref 70–99)
HBA1C MFR BLD: 6.3 % (ref ?–5.7)
HCT VFR BLD AUTO: 53.8 %
HDLC SERPL-MCNC: 55 MG/DL (ref 40–59)
HEMOGLOBIN A1C: 6 % (ref 4.3–5.6)
HGB BLD-MCNC: 17.5 G/DL
IMM GRANULOCYTES # BLD AUTO: 0.02 X10(3) UL (ref 0–1)
IMM GRANULOCYTES NFR BLD: 0.2 %
LDLC SERPL CALC-MCNC: 121 MG/DL (ref ?–100)
LYMPHOCYTES # BLD AUTO: 2.6 X10(3) UL (ref 1–4)
LYMPHOCYTES NFR BLD AUTO: 26.8 %
MCH RBC QN AUTO: 29.9 PG (ref 26–34)
MCHC RBC AUTO-ENTMCNC: 32.5 G/DL (ref 31–37)
MCV RBC AUTO: 92 FL
MICROALBUMIN UR-MCNC: 1.96 MG/DL
MICROALBUMIN/CREAT 24H UR-RTO: 12 UG/MG (ref ?–30)
MONOCYTES # BLD AUTO: 0.73 X10(3) UL (ref 0.1–1)
MONOCYTES NFR BLD AUTO: 7.5 %
NEUTROPHILS # BLD AUTO: 6.21 X10 (3) UL (ref 1.5–7.7)
NEUTROPHILS # BLD AUTO: 6.21 X10(3) UL (ref 1.5–7.7)
NEUTROPHILS NFR BLD AUTO: 64.2 %
NONHDLC SERPL-MCNC: 139 MG/DL (ref ?–130)
OSMOLALITY SERPL CALC.SUM OF ELEC: 283 MOSM/KG (ref 275–295)
PLATELET # BLD AUTO: 311 10(3)UL (ref 150–450)
POTASSIUM SERPL-SCNC: 4.2 MMOL/L (ref 3.5–5.1)
PROT SERPL-MCNC: 8.2 G/DL (ref 6.4–8.2)
RBC # BLD AUTO: 5.85 X10(6)UL
SODIUM SERPL-SCNC: 137 MMOL/L (ref 136–145)
T4 FREE SERPL-MCNC: 1 NG/DL (ref 0.8–1.7)
TRIGL SERPL-MCNC: 102 MG/DL (ref 30–149)
TSI SER-ACNC: 1.79 MIU/ML (ref 0.36–3.74)
VIT B12 SERPL-MCNC: 620 PG/ML (ref 193–986)
VLDLC SERPL CALC-MCNC: 18 MG/DL (ref 0–30)
WBC # BLD AUTO: 9.7 X10(3) UL (ref 4–11)

## 2022-06-27 PROCEDURE — 80053 COMPREHEN METABOLIC PANEL: CPT | Performed by: FAMILY MEDICINE

## 2022-06-27 PROCEDURE — 93000 ELECTROCARDIOGRAM COMPLETE: CPT | Performed by: FAMILY MEDICINE

## 2022-06-27 PROCEDURE — 3044F HG A1C LEVEL LT 7.0%: CPT | Performed by: FAMILY MEDICINE

## 2022-06-27 PROCEDURE — 80061 LIPID PANEL: CPT | Performed by: FAMILY MEDICINE

## 2022-06-27 PROCEDURE — 3008F BODY MASS INDEX DOCD: CPT | Performed by: FAMILY MEDICINE

## 2022-06-27 PROCEDURE — 82607 VITAMIN B-12: CPT | Performed by: FAMILY MEDICINE

## 2022-06-27 PROCEDURE — 99215 OFFICE O/P EST HI 40 MIN: CPT | Performed by: FAMILY MEDICINE

## 2022-06-27 PROCEDURE — 83036 HEMOGLOBIN GLYCOSYLATED A1C: CPT | Performed by: FAMILY MEDICINE

## 2022-06-27 PROCEDURE — 3074F SYST BP LT 130 MM HG: CPT | Performed by: FAMILY MEDICINE

## 2022-06-27 PROCEDURE — 3061F NEG MICROALBUMINURIA REV: CPT | Performed by: FAMILY MEDICINE

## 2022-06-27 PROCEDURE — 82043 UR ALBUMIN QUANTITATIVE: CPT | Performed by: FAMILY MEDICINE

## 2022-06-27 PROCEDURE — 84443 ASSAY THYROID STIM HORMONE: CPT | Performed by: FAMILY MEDICINE

## 2022-06-27 PROCEDURE — 84439 ASSAY OF FREE THYROXINE: CPT | Performed by: FAMILY MEDICINE

## 2022-06-27 PROCEDURE — 85025 COMPLETE CBC W/AUTO DIFF WBC: CPT | Performed by: FAMILY MEDICINE

## 2022-06-27 PROCEDURE — 82570 ASSAY OF URINE CREATININE: CPT | Performed by: FAMILY MEDICINE

## 2022-06-27 PROCEDURE — 3078F DIAST BP <80 MM HG: CPT | Performed by: FAMILY MEDICINE

## 2022-06-27 RX ORDER — LOSARTAN POTASSIUM 50 MG/1
TABLET ORAL
Qty: 90 TABLET | Refills: 1 | OUTPATIENT
Start: 2022-06-27

## 2022-06-27 RX ORDER — LOSARTAN POTASSIUM 50 MG/1
50 TABLET ORAL DAILY
Qty: 90 TABLET | Refills: 1 | Status: SHIPPED | OUTPATIENT
Start: 2022-06-27 | End: 2022-06-28

## 2022-06-27 RX ORDER — ROSUVASTATIN CALCIUM 20 MG/1
20 TABLET, COATED ORAL NIGHTLY
Qty: 90 TABLET | Refills: 0 | Status: SHIPPED | OUTPATIENT
Start: 2022-06-27

## 2022-06-28 PROBLEM — E11.29 CONTROLLED TYPE 2 DIABETES MELLITUS WITH MICROALBUMINURIA, WITHOUT LONG-TERM CURRENT USE OF INSULIN (HCC): Status: ACTIVE | Noted: 2018-09-12

## 2022-06-28 PROBLEM — R42 EPISODIC LIGHTHEADEDNESS: Status: ACTIVE | Noted: 2022-06-28

## 2022-06-28 PROBLEM — R80.9 CONTROLLED TYPE 2 DIABETES MELLITUS WITH MICROALBUMINURIA, WITHOUT LONG-TERM CURRENT USE OF INSULIN: Status: ACTIVE | Noted: 2018-09-12

## 2022-06-28 PROBLEM — R80.9 CONTROLLED TYPE 2 DIABETES MELLITUS WITH MICROALBUMINURIA, WITHOUT LONG-TERM CURRENT USE OF INSULIN (HCC): Status: ACTIVE | Noted: 2018-09-12

## 2022-06-28 PROBLEM — E11.29 CONTROLLED TYPE 2 DIABETES MELLITUS WITH MICROALBUMINURIA, WITHOUT LONG-TERM CURRENT USE OF INSULIN: Status: ACTIVE | Noted: 2018-09-12

## 2022-06-28 RX ORDER — LOSARTAN POTASSIUM 50 MG/1
25 TABLET ORAL DAILY
COMMUNITY
Start: 2022-06-28

## 2022-06-28 NOTE — PROGRESS NOTES
Increase rosuvastatin from 10 mg to 20 mg secondary to LDL goal less than 100 repeat lipids and liver function tests in 8 weeks  Thyroid is normal  B12 level is unknown  Kidney function and liver function tests are normal.  Urine microalbumin creatinine ratio is normal  Hemoglobin A1c 6.3  Nothing so far on testing to explain your symptoms.

## 2022-07-01 ENCOUNTER — HOSPITAL ENCOUNTER (OUTPATIENT)
Dept: CV DIAGNOSTICS | Age: 42
Discharge: HOME | End: 2022-07-01
Attending: FAMILY MEDICINE
Payer: COMMERCIAL

## 2022-07-01 DIAGNOSIS — R42 EPISODIC LIGHTHEADEDNESS: ICD-10-CM

## 2022-07-01 DIAGNOSIS — R00.2 PALPITATIONS: ICD-10-CM

## 2022-07-01 PROCEDURE — 93225 XTRNL ECG REC<48 HRS REC: CPT | Performed by: FAMILY MEDICINE

## 2022-08-20 ENCOUNTER — HOSPITAL ENCOUNTER (OUTPATIENT)
Dept: CV DIAGNOSTICS | Facility: HOSPITAL | Age: 42
End: 2022-08-20
Attending: FAMILY MEDICINE
Payer: COMMERCIAL

## 2022-08-20 ENCOUNTER — HOSPITAL ENCOUNTER (OUTPATIENT)
Dept: CV DIAGNOSTICS | Facility: HOSPITAL | Age: 42
Discharge: HOME | End: 2022-08-20
Attending: FAMILY MEDICINE
Payer: COMMERCIAL

## 2022-08-20 DIAGNOSIS — R00.2 PALPITATIONS: ICD-10-CM

## 2022-08-20 PROCEDURE — 93306 TTE W/DOPPLER COMPLETE: CPT | Performed by: FAMILY MEDICINE

## 2022-08-20 NOTE — PROGRESS NOTES
EKG normal sinus rhythm  2D echocardiogram normal ejection fraction at 50 to 55%.   Left ventricular function is normal  Valves are normal.  Overall echocardiogram is normal.

## 2022-08-25 DIAGNOSIS — E11.65 UNCONTROLLED TYPE 2 DIABETES MELLITUS WITH HYPERGLYCEMIA (HCC): ICD-10-CM

## 2022-08-31 ENCOUNTER — LAB ENCOUNTER (OUTPATIENT)
Dept: LAB | Age: 42
End: 2022-08-31
Attending: FAMILY MEDICINE
Payer: COMMERCIAL

## 2022-08-31 DIAGNOSIS — R42 EPISODIC LIGHTHEADEDNESS: ICD-10-CM

## 2022-08-31 DIAGNOSIS — R80.9 CONTROLLED TYPE 2 DIABETES MELLITUS WITH MICROALBUMINURIA, WITHOUT LONG-TERM CURRENT USE OF INSULIN (HCC): ICD-10-CM

## 2022-08-31 DIAGNOSIS — R00.2 PALPITATIONS: ICD-10-CM

## 2022-08-31 DIAGNOSIS — E11.29 CONTROLLED TYPE 2 DIABETES MELLITUS WITH MICROALBUMINURIA, WITHOUT LONG-TERM CURRENT USE OF INSULIN (HCC): ICD-10-CM

## 2022-08-31 DIAGNOSIS — R53.83 FATIGUE, UNSPECIFIED TYPE: ICD-10-CM

## 2022-08-31 DIAGNOSIS — Z82.49 FH: CAD (CORONARY ARTERY DISEASE): ICD-10-CM

## 2022-09-01 LAB — SARS-COV-2 RNA RESP QL NAA+PROBE: NOT DETECTED

## 2022-09-03 ENCOUNTER — HOSPITAL ENCOUNTER (OUTPATIENT)
Dept: CV DIAGNOSTICS | Facility: HOSPITAL | Age: 42
Discharge: HOME | End: 2022-09-03
Attending: FAMILY MEDICINE
Payer: COMMERCIAL

## 2022-09-03 DIAGNOSIS — R53.83 FATIGUE, UNSPECIFIED TYPE: ICD-10-CM

## 2022-09-03 DIAGNOSIS — R42 EPISODIC LIGHTHEADEDNESS: ICD-10-CM

## 2022-09-03 DIAGNOSIS — R80.9 CONTROLLED TYPE 2 DIABETES MELLITUS WITH MICROALBUMINURIA, WITHOUT LONG-TERM CURRENT USE OF INSULIN (HCC): ICD-10-CM

## 2022-09-03 DIAGNOSIS — R00.2 PALPITATIONS: ICD-10-CM

## 2022-09-03 DIAGNOSIS — E11.29 CONTROLLED TYPE 2 DIABETES MELLITUS WITH MICROALBUMINURIA, WITHOUT LONG-TERM CURRENT USE OF INSULIN (HCC): ICD-10-CM

## 2022-09-03 DIAGNOSIS — Z82.49 FH: CAD (CORONARY ARTERY DISEASE): ICD-10-CM

## 2022-09-03 PROCEDURE — 93350 STRESS TTE ONLY: CPT | Performed by: FAMILY MEDICINE

## 2022-09-03 PROCEDURE — 93017 CV STRESS TEST TRACING ONLY: CPT | Performed by: FAMILY MEDICINE

## 2022-09-03 PROCEDURE — 93018 CV STRESS TEST I&R ONLY: CPT | Performed by: FAMILY MEDICINE

## 2022-09-10 NOTE — PROGRESS NOTES
Normal stress echocardiogram with occasional PVCs more PVCs noted after exercise.   If noticing palpitations more frequently could benefit from a beta-blocker to help minimize the symptoms

## 2022-09-24 DIAGNOSIS — E78.2 MIXED HYPERLIPIDEMIA: ICD-10-CM

## 2022-09-26 RX ORDER — ROSUVASTATIN CALCIUM 20 MG/1
TABLET, COATED ORAL
Qty: 90 TABLET | Refills: 0 | Status: SHIPPED | OUTPATIENT
Start: 2022-09-26

## 2022-11-20 DIAGNOSIS — E11.65 UNCONTROLLED TYPE 2 DIABETES MELLITUS WITH HYPERGLYCEMIA (HCC): ICD-10-CM

## 2022-11-22 ENCOUNTER — TELEPHONE (OUTPATIENT)
Dept: FAMILY MEDICINE CLINIC | Facility: CLINIC | Age: 42
End: 2022-11-22

## 2022-11-22 DIAGNOSIS — E11.65 UNCONTROLLED TYPE 2 DIABETES MELLITUS WITH HYPERGLYCEMIA (HCC): ICD-10-CM

## 2022-11-22 RX ORDER — SEMAGLUTIDE 1.34 MG/ML
1 INJECTION, SOLUTION SUBCUTANEOUS WEEKLY
Qty: 2 EACH | Refills: 3 | Status: SHIPPED | OUTPATIENT
Start: 2022-11-22

## 2022-11-22 NOTE — TELEPHONE ENCOUNTER
Please advise if patient should be taking 1mg weekly of ozempic or 2mg? New dose pen of 2mg/1.5ml was sent with sig of 1mg weekly. Pharmacy states plan will not cover that.

## 2022-11-25 ENCOUNTER — TELEPHONE (OUTPATIENT)
Dept: FAMILY MEDICINE CLINIC | Facility: CLINIC | Age: 42
End: 2022-11-25

## 2022-11-25 NOTE — TELEPHONE ENCOUNTER
Abdiaziz Chaney (Key: [id number])    This request has been approved. Please note any additional information provided by [facility] at the bottom of your screen.

## 2022-11-25 NOTE — TELEPHONE ENCOUNTER
Phoned pharmacy to confirm need for PA. They state PA needed and to phone [phone]. Phoned plan. They will fax form to be filled out. Awaiting fax.

## 2022-12-20 RX ORDER — SEMAGLUTIDE 1.34 MG/ML
1 INJECTION, SOLUTION SUBCUTANEOUS WEEKLY
Qty: 2 EACH | Refills: 3 | OUTPATIENT
Start: 2022-12-20

## 2022-12-29 DIAGNOSIS — E11.29 CONTROLLED TYPE 2 DIABETES MELLITUS WITH MICROALBUMINURIA, WITHOUT LONG-TERM CURRENT USE OF INSULIN (HCC): ICD-10-CM

## 2022-12-29 DIAGNOSIS — R80.9 CONTROLLED TYPE 2 DIABETES MELLITUS WITH MICROALBUMINURIA, WITHOUT LONG-TERM CURRENT USE OF INSULIN (HCC): ICD-10-CM

## 2022-12-29 DIAGNOSIS — E78.2 MIXED HYPERLIPIDEMIA: ICD-10-CM

## 2022-12-29 RX ORDER — ROSUVASTATIN CALCIUM 20 MG/1
TABLET, COATED ORAL
Qty: 90 TABLET | Refills: 0 | Status: SHIPPED | OUTPATIENT
Start: 2022-12-29

## 2022-12-29 RX ORDER — EMPAGLIFLOZIN 10 MG/1
TABLET, FILM COATED ORAL
Qty: 90 TABLET | Refills: 0 | Status: SHIPPED | OUTPATIENT
Start: 2022-12-29

## 2022-12-29 RX ORDER — LOSARTAN POTASSIUM 50 MG/1
TABLET ORAL
Qty: 90 TABLET | Refills: 0 | Status: SHIPPED | OUTPATIENT
Start: 2022-12-29

## 2023-02-09 RX ORDER — SEMAGLUTIDE 1.34 MG/ML
1 INJECTION, SOLUTION SUBCUTANEOUS WEEKLY
Qty: 2 EACH | Refills: 3 | OUTPATIENT
Start: 2023-02-09

## 2024-08-18 ENCOUNTER — HOSPITAL ENCOUNTER (OUTPATIENT)
Dept: CT IMAGING | Age: 44
Discharge: HOME | End: 2024-08-18
Attending: FAMILY MEDICINE

## 2024-08-18 DIAGNOSIS — Z13.9 SCREENING FOR CONDITION: ICD-10-CM

## 2024-08-19 NOTE — PROGRESS NOTES
Incidental small 4 mm noncalcified nodule in the left lower lobe secondary to no history of smoking no repeat CT scan needed at this time.

## (undated) DIAGNOSIS — E11.65 UNCONTROLLED TYPE 2 DIABETES MELLITUS WITH HYPERGLYCEMIA (HCC): ICD-10-CM

## (undated) DEVICE — GAMMEX® PI HYBRID SIZE 7, STERILE POWDER-FREE SURGICAL GLOVE, POLYISOPRENE AND NEOPRENE BLEND: Brand: GAMMEX

## (undated) DEVICE — CYSTO CDS-LF: Brand: MEDLINE INDUSTRIES, INC.

## (undated) DEVICE — SOL  .9 1000ML BTL

## (undated) DEVICE — STERILE POLYISOPRENE POWDER-FREE SURGICAL GLOVES: Brand: PROTEXIS

## (undated) DEVICE — SEAL Y BIOPSY PORT P6R SCOPE

## (undated) DEVICE — 3M™ TEGADERM™ TRANSPARENT FILM DRESSING, 1626W, 4 IN X 4-3/4 IN (10 CM X 12 CM), 50 EACH/CARTON, 4 CARTON/CASE: Brand: 3M™ TEGADERM™

## (undated) DEVICE — 365 SINGLE USE LASER FIBER

## (undated) DEVICE — NITINOL STONE RETRIEVAL BASKET: Brand: ZERO TIP

## (undated) DEVICE — TIGERTAIL 5F FLXTIP 70CM

## (undated) DEVICE — URETERAL ACCESS SHEATH SET: Brand: NAVIGATOR HD

## (undated) DEVICE — 1.9FR NITINOL TIPLESS BSKT

## (undated) DEVICE — SOL  .9 3000ML

## (undated) DEVICE — KENDALL SCD EXPRESS SLEEVES, KNEE LENGTH, MEDIUM: Brand: KENDALL SCD

## (undated) DEVICE — SEAL BIOPSY PORT ACMI

## (undated) DEVICE — Device

## (undated) DEVICE — PLASTC TOOMEY SYRNG DISP

## (undated) DEVICE — NITINOL WIRE STR 038

## (undated) DEVICE — ZIPWIRE GUIDEWIRE .035X150 STR

## (undated) DEVICE — FLEXIVA PULSE ID 242 BOX 5

## (undated) DEVICE — UROFORCE BALLOON 6MMX4CM W/INF

## (undated) NOTE — LETTER
Melvina Bright [address] [address]  [place], [address] [facility]    Consent for Operation  Date: __________________                                Time: _______________    1.  I authorize the performance upon Jordan Arteaga the following operation:  Procedure revealed by the pictures or by descriptive texts accompanying them. If the procedure has been videotaped, the surgeon will obtain the original videotape. The hospital will not be responsible for storage or maintenance of this tape.   7. For the purpose of a THAT MY DOCTOR PROVIDED ME WITH THE ABOVE EXPLANATIONS, THAT ALL BLANKS OR STATEMENTS REQUIRING INSERTION OR COMPLETION WERE FILLED IN.     Signature of Patient:   ___________________________    When the patient is a minor or mentally incompetent to give co iii. All of the medicines I take (including prescriptions, herbal supplements, and pills I can buy without a prescription (including street drugs/illegal medications).  Failure to inform my anesthesiologist about these medicines may increase my risk of anes _____________________________________________________________________________  Anesthesiologist Signature     Date   Time  I have discussed the procedure and information above with the patient (or patient’s representative) and answered their questions.  The

## (undated) NOTE — Clinical Note
FYI: TCM outreach completed. Pt has HFU with Socorro Farrar on 6/30.  Sent TE to PCP office re: if visit type can be changed to TCM HFU

## (undated) NOTE — LETTER
Date: 3/17/2018    Patient Name: Teetee Gaona          To Whom it may concern: The above patient was seen at the [facility] for treatment of a medical condition.     This patient is on hydrocodone for kidney stone and under our managemen

## (undated) NOTE — LETTER
Melvina Bright [address]  [place], [address] [facility]    Consent for Operation  Date: __________________                                Time: _______________    1.  I authorize the performance upon Arminda Hawthorne the following operation:  Procedure revealed by the pictures or by descriptive texts accompanying them. If the procedure has been videotaped, the surgeon will obtain the original videotape. The hospital will not be responsible for storage or maintenance of this tape.   7. For the purpose of a THAT MY DOCTOR PROVIDED ME WITH THE ABOVE EXPLANATIONS, THAT ALL BLANKS OR STATEMENTS REQUIRING INSERTION OR COMPLETION WERE FILLED IN.     Signature of Patient:   ___________________________    When the patient is a minor or mentally incompetent to give co iii. All of the medicines I take (including prescriptions, herbal supplements, and pills I can buy without a prescription (including street drugs/illegal medications).  Failure to inform my anesthesiologist about these medicines may increase my risk of anes _____________________________________________________________________________  Anesthesiologist Signature     Date   Time  I have discussed the procedure and information above with the patient (or patient’s representative) and answered their questions.  The

## (undated) NOTE — LETTER
Melvina Bright [address] [address]  [place], [address] [facility]    Consent for Operation  Date: __________________                                Time: _______________    1.  I authorize the performance upon Elizabeth Holman the following operation:  Procedure medical, scientific, or educational purposes, provided my identity is not revealed by the pictures or by descriptive texts accompanying them. If the procedure has been videotaped, the surgeon will obtain the original videotape.  The hospital will not be res I CERTIFY THAT I HAVE READ AND UNDERSTAND THE ABOVE CONSENT TO OPERATION, THAT MY DOCTOR PROVIDED ME WITH THE ABOVE EXPLANATIONS, THAT ALL BLANKS OR STATEMENTS REQUIRING INSERTION OR COMPLETION WERE FILLED IN.     Signature of Patient:   ___________________ ii. The last time that I ate or drank.  iii. All of the medicines I take (including prescriptions, herbal supplements, and pills I can buy without a prescription (including street drugs/illegal medications).  Failure to inform my anesthesiologist about thes _____________________________________________________________________________  Anesthesiologist Signature     Date   Time  I have discussed the procedure and information above with the patient (or patient’s representative) and answered their questions.  The

## (undated) NOTE — LETTER
Melvina Bright [address]  [place] [facility]    Consent for Operation  Date: __________________                                Time: _______________    1.  I authorize the performance upon Steen Oppenheim the following operation:  Procedure revealed by the pictures or by descriptive texts accompanying them. If the procedure has been videotaped, the surgeon will obtain the original videotape. The hospital will not be responsible for storage or maintenance of this tape.   7. For the purpose of a THAT MY DOCTOR PROVIDED ME WITH THE ABOVE EXPLANATIONS, THAT ALL BLANKS OR STATEMENTS REQUIRING INSERTION OR COMPLETION WERE FILLED IN.     Signature of Patient:   ___________________________    When the patient is a minor or mentally incompetent to give co iii. All of the medicines I take (including prescriptions, herbal supplements, and pills I can buy without a prescription (including street drugs/illegal medications).  Failure to inform my anesthesiologist about these medicines may increase my risk of anes _____________________________________________________________________________  Anesthesiologist Signature     Date   Time  I have discussed the procedure and information above with the patient (or patient’s representative) and answered their questions.  The

## (undated) NOTE — LETTER
Melvina Bright [address] [address]  [place] [facility]    Consent for Operation  Date: __________________                                Time: _______________    1.  I authorize the performance upon Teetee Gaona the following operation:  Procedure revealed by the pictures or by descriptive texts accompanying them. If the procedure has been videotaped, the surgeon will obtain the original videotape. The hospital will not be responsible for storage or maintenance of this tape.   7. For the purpose of a THAT MY DOCTOR PROVIDED ME WITH THE ABOVE EXPLANATIONS, THAT ALL BLANKS OR STATEMENTS REQUIRING INSERTION OR COMPLETION WERE FILLED IN.     Signature of Patient:   ___________________________    When the patient is a minor or mentally incompetent to give co iii. All of the medicines I take (including prescriptions, herbal supplements, and pills I can buy without a prescription (including street drugs/illegal medications).  Failure to inform my anesthesiologist about these medicines may increase my risk of anes _____________________________________________________________________________  Anesthesiologist Signature     Date   Time  I have discussed the procedure and information above with the patient (or patient’s representative) and answered their questions.  The